# Patient Record
Sex: FEMALE | Race: WHITE | NOT HISPANIC OR LATINO
[De-identification: names, ages, dates, MRNs, and addresses within clinical notes are randomized per-mention and may not be internally consistent; named-entity substitution may affect disease eponyms.]

---

## 2017-06-03 ENCOUNTER — TRANSCRIPTION ENCOUNTER (OUTPATIENT)
Age: 37
End: 2017-06-03

## 2017-06-03 ENCOUNTER — INPATIENT (INPATIENT)
Facility: HOSPITAL | Age: 37
LOS: 2 days | Discharge: HOME | End: 2017-06-06
Attending: INTERNAL MEDICINE

## 2017-06-03 DIAGNOSIS — N10 ACUTE PYELONEPHRITIS: ICD-10-CM

## 2017-06-28 DIAGNOSIS — R94.5 ABNORMAL RESULTS OF LIVER FUNCTION STUDIES: ICD-10-CM

## 2017-06-28 DIAGNOSIS — Z98.51 TUBAL LIGATION STATUS: ICD-10-CM

## 2017-06-28 DIAGNOSIS — K59.00 CONSTIPATION, UNSPECIFIED: ICD-10-CM

## 2017-06-28 DIAGNOSIS — B96.20 UNSPECIFIED ESCHERICHIA COLI [E. COLI] AS THE CAUSE OF DISEASES CLASSIFIED ELSEWHERE: ICD-10-CM

## 2017-06-28 DIAGNOSIS — N10 ACUTE PYELONEPHRITIS: ICD-10-CM

## 2017-09-29 ENCOUNTER — EMERGENCY (EMERGENCY)
Facility: HOSPITAL | Age: 37
LOS: 0 days | Discharge: HOME | End: 2017-09-30
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
## 2017-09-29 DIAGNOSIS — Z90.49 ACQUIRED ABSENCE OF OTHER SPECIFIED PARTS OF DIGESTIVE TRACT: ICD-10-CM

## 2017-09-29 DIAGNOSIS — Z98.890 OTHER SPECIFIED POSTPROCEDURAL STATES: ICD-10-CM

## 2017-09-29 DIAGNOSIS — S61.210A LACERATION WITHOUT FOREIGN BODY OF RIGHT INDEX FINGER WITHOUT DAMAGE TO NAIL, INITIAL ENCOUNTER: ICD-10-CM
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
## 2017-09-29 DIAGNOSIS — N10 ACUTE PYELONEPHRITIS: ICD-10-CM

## 2017-09-29 DIAGNOSIS — Z98.51 TUBAL LIGATION STATUS: ICD-10-CM

## 2017-09-29 DIAGNOSIS — Z23 ENCOUNTER FOR IMMUNIZATION: ICD-10-CM

## 2017-09-29 DIAGNOSIS — S61.212A LACERATION WITHOUT FOREIGN BODY OF RIGHT MIDDLE FINGER WITHOUT DAMAGE TO NAIL, INITIAL ENCOUNTER: ICD-10-CM
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
## 2017-10-02 ENCOUNTER — EMERGENCY (EMERGENCY)
Facility: HOSPITAL | Age: 37
LOS: 0 days | Discharge: HOME | End: 2017-10-02
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
## 2017-10-02 DIAGNOSIS — Z98.51 TUBAL LIGATION STATUS: ICD-10-CM

## 2017-10-02 DIAGNOSIS — N10 ACUTE PYELONEPHRITIS: ICD-10-CM
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
## 2017-10-02 DIAGNOSIS — M79.89 OTHER SPECIFIED SOFT TISSUE DISORDERS: ICD-10-CM

## 2017-10-02 DIAGNOSIS — Z90.49 ACQUIRED ABSENCE OF OTHER SPECIFIED PARTS OF DIGESTIVE TRACT: ICD-10-CM
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
## 2017-10-02 DIAGNOSIS — T88.1XXA OTHER COMPLICATIONS FOLLOWING IMMUNIZATION, NOT ELSEWHERE CLASSIFIED, INITIAL ENCOUNTER: ICD-10-CM

## 2018-02-27 ENCOUNTER — OUTPATIENT (OUTPATIENT)
Dept: OUTPATIENT SERVICES | Facility: HOSPITAL | Age: 38
LOS: 1 days | Discharge: HOME | End: 2018-02-27

## 2018-02-27 DIAGNOSIS — R10.819 ABDOMINAL TENDERNESS, UNSPECIFIED SITE: ICD-10-CM

## 2018-08-13 ENCOUNTER — EMERGENCY (EMERGENCY)
Facility: HOSPITAL | Age: 38
LOS: 0 days | Discharge: HOME | End: 2018-08-14
Attending: EMERGENCY MEDICINE | Admitting: EMERGENCY MEDICINE

## 2018-08-13 VITALS
HEIGHT: 67 IN | TEMPERATURE: 97 F | SYSTOLIC BLOOD PRESSURE: 109 MMHG | RESPIRATION RATE: 16 BRPM | HEART RATE: 77 BPM | WEIGHT: 149.91 LBS | DIASTOLIC BLOOD PRESSURE: 76 MMHG | OXYGEN SATURATION: 96 %

## 2018-08-13 VITALS
DIASTOLIC BLOOD PRESSURE: 76 MMHG | HEART RATE: 68 BPM | TEMPERATURE: 98 F | SYSTOLIC BLOOD PRESSURE: 114 MMHG | OXYGEN SATURATION: 100 % | RESPIRATION RATE: 17 BRPM

## 2018-08-13 DIAGNOSIS — Z87.440 PERSONAL HISTORY OF URINARY (TRACT) INFECTIONS: ICD-10-CM

## 2018-08-13 DIAGNOSIS — Z79.2 LONG TERM (CURRENT) USE OF ANTIBIOTICS: ICD-10-CM

## 2018-08-13 DIAGNOSIS — Z90.49 ACQUIRED ABSENCE OF OTHER SPECIFIED PARTS OF DIGESTIVE TRACT: ICD-10-CM

## 2018-08-13 DIAGNOSIS — R39.9 UNSPECIFIED SYMPTOMS AND SIGNS INVOLVING THE GENITOURINARY SYSTEM: ICD-10-CM

## 2018-08-13 DIAGNOSIS — N39.0 URINARY TRACT INFECTION, SITE NOT SPECIFIED: ICD-10-CM

## 2018-08-13 DIAGNOSIS — Z79.899 OTHER LONG TERM (CURRENT) DRUG THERAPY: ICD-10-CM

## 2018-08-13 DIAGNOSIS — M54.5 LOW BACK PAIN: ICD-10-CM

## 2018-08-13 LAB
ALBUMIN SERPL ELPH-MCNC: 4.7 G/DL — SIGNIFICANT CHANGE UP (ref 3.5–5.2)
ALP SERPL-CCNC: 54 U/L — SIGNIFICANT CHANGE UP (ref 30–115)
ALT FLD-CCNC: 12 U/L — SIGNIFICANT CHANGE UP (ref 0–41)
ANION GAP SERPL CALC-SCNC: 17 MMOL/L — HIGH (ref 7–14)
APPEARANCE UR: ABNORMAL
AST SERPL-CCNC: 13 U/L — SIGNIFICANT CHANGE UP (ref 0–41)
BACTERIA # UR AUTO: ABNORMAL
BASOPHILS # BLD AUTO: 0.03 K/UL — SIGNIFICANT CHANGE UP (ref 0–0.2)
BASOPHILS NFR BLD AUTO: 0.4 % — SIGNIFICANT CHANGE UP (ref 0–1)
BILIRUB SERPL-MCNC: 0.5 MG/DL — SIGNIFICANT CHANGE UP (ref 0.2–1.2)
BILIRUB UR-MCNC: NEGATIVE — SIGNIFICANT CHANGE UP
BUN SERPL-MCNC: 13 MG/DL — SIGNIFICANT CHANGE UP (ref 10–20)
CA-I SERPL-SCNC: 1.17 MMOL/L — SIGNIFICANT CHANGE UP (ref 1.12–1.3)
CALCIUM SERPL-MCNC: 9.3 MG/DL — SIGNIFICANT CHANGE UP (ref 8.5–10.1)
CHLORIDE SERPL-SCNC: 100 MMOL/L — SIGNIFICANT CHANGE UP (ref 98–110)
CO2 SERPL-SCNC: 23 MMOL/L — SIGNIFICANT CHANGE UP (ref 17–32)
COD CRY URNS QL: NEGATIVE — SIGNIFICANT CHANGE UP
COLOR SPEC: ABNORMAL
CREAT SERPL-MCNC: 0.7 MG/DL — SIGNIFICANT CHANGE UP (ref 0.7–1.5)
DIFF PNL FLD: NEGATIVE — SIGNIFICANT CHANGE UP
EOSINOPHIL # BLD AUTO: 0.22 K/UL — SIGNIFICANT CHANGE UP (ref 0–0.7)
EOSINOPHIL NFR BLD AUTO: 2.9 % — SIGNIFICANT CHANGE UP (ref 0–8)
EPI CELLS # UR: ABNORMAL /HPF
GAS PNL BLDV: 138 MMOL/L — SIGNIFICANT CHANGE UP (ref 136–145)
GAS PNL BLDV: SIGNIFICANT CHANGE UP
GLUCOSE SERPL-MCNC: 93 MG/DL — SIGNIFICANT CHANGE UP (ref 70–99)
GLUCOSE UR QL: 100 MG/DL
GRAN CASTS # UR COMP ASSIST: NEGATIVE — SIGNIFICANT CHANGE UP
HCG SERPL QL: NEGATIVE — SIGNIFICANT CHANGE UP
HCO3 BLDV-SCNC: 27 MMOL/L — SIGNIFICANT CHANGE UP (ref 22–29)
HCT VFR BLD CALC: 44.1 % — SIGNIFICANT CHANGE UP (ref 37–47)
HCT VFR BLDA CALC: 48.2 % — HIGH (ref 34–44)
HGB BLD CALC-MCNC: 15.7 G/DL — SIGNIFICANT CHANGE UP (ref 14–18)
HGB BLD-MCNC: 14.7 G/DL — SIGNIFICANT CHANGE UP (ref 12–16)
HOROWITZ INDEX BLDV+IHG-RTO: 21 — SIGNIFICANT CHANGE UP
HYALINE CASTS # UR AUTO: NEGATIVE — SIGNIFICANT CHANGE UP
IMM GRANULOCYTES NFR BLD AUTO: 0.3 % — SIGNIFICANT CHANGE UP (ref 0.1–0.3)
KETONES UR-MCNC: ABNORMAL
LACTATE BLDV-MCNC: 0.9 MMOL/L — SIGNIFICANT CHANGE UP (ref 0.5–1.6)
LACTATE SERPL-SCNC: 3.3 MMOL/L — HIGH (ref 0.5–2.2)
LEUKOCYTE ESTERASE UR-ACNC: NEGATIVE — SIGNIFICANT CHANGE UP
LYMPHOCYTES # BLD AUTO: 1.56 K/UL — SIGNIFICANT CHANGE UP (ref 1.2–3.4)
LYMPHOCYTES # BLD AUTO: 20.6 % — SIGNIFICANT CHANGE UP (ref 20.5–51.1)
MCHC RBC-ENTMCNC: 29.3 PG — SIGNIFICANT CHANGE UP (ref 27–31)
MCHC RBC-ENTMCNC: 33.3 G/DL — SIGNIFICANT CHANGE UP (ref 32–37)
MCV RBC AUTO: 88 FL — SIGNIFICANT CHANGE UP (ref 81–99)
MONOCYTES # BLD AUTO: 0.45 K/UL — SIGNIFICANT CHANGE UP (ref 0.1–0.6)
MONOCYTES NFR BLD AUTO: 5.9 % — SIGNIFICANT CHANGE UP (ref 1.7–9.3)
NEUTROPHILS # BLD AUTO: 5.29 K/UL — SIGNIFICANT CHANGE UP (ref 1.4–6.5)
NEUTROPHILS NFR BLD AUTO: 69.9 % — SIGNIFICANT CHANGE UP (ref 42.2–75.2)
NITRITE UR-MCNC: POSITIVE
NRBC # BLD: 0 /100 WBCS — SIGNIFICANT CHANGE UP (ref 0–0)
PCO2 BLDV: 49 MMHG — SIGNIFICANT CHANGE UP (ref 41–51)
PH BLDV: 7.34 — SIGNIFICANT CHANGE UP (ref 7.26–7.43)
PH UR: 5.5 — SIGNIFICANT CHANGE UP (ref 5–8)
PLATELET # BLD AUTO: 271 K/UL — SIGNIFICANT CHANGE UP (ref 130–400)
PO2 BLDV: 26 MMHG — SIGNIFICANT CHANGE UP (ref 20–40)
POTASSIUM BLDV-SCNC: 3.9 MMOL/L — SIGNIFICANT CHANGE UP (ref 3.3–5.6)
POTASSIUM SERPL-MCNC: 3.9 MMOL/L — SIGNIFICANT CHANGE UP (ref 3.5–5)
POTASSIUM SERPL-SCNC: 3.9 MMOL/L — SIGNIFICANT CHANGE UP (ref 3.5–5)
PROT SERPL-MCNC: 7.4 G/DL — SIGNIFICANT CHANGE UP (ref 6–8)
PROT UR-MCNC: 30 MG/DL
RBC # BLD: 5.01 M/UL — SIGNIFICANT CHANGE UP (ref 4.2–5.4)
RBC # FLD: 12.2 % — SIGNIFICANT CHANGE UP (ref 11.5–14.5)
RBC CASTS # UR COMP ASSIST: NEGATIVE — SIGNIFICANT CHANGE UP
SAO2 % BLDV: 42 % — SIGNIFICANT CHANGE UP
SODIUM SERPL-SCNC: 140 MMOL/L — SIGNIFICANT CHANGE UP (ref 135–146)
SP GR SPEC: 1.01 — SIGNIFICANT CHANGE UP (ref 1.01–1.03)
TRI-PHOS CRY UR QL COMP ASSIST: NEGATIVE — SIGNIFICANT CHANGE UP
URATE CRY FLD QL MICRO: NEGATIVE — SIGNIFICANT CHANGE UP
UROBILINOGEN FLD QL: 2 MG/DL (ref 0.2–0.2)
WBC # BLD: 7.57 K/UL — SIGNIFICANT CHANGE UP (ref 4.8–10.8)
WBC # FLD AUTO: 7.57 K/UL — SIGNIFICANT CHANGE UP (ref 4.8–10.8)
WBC UR QL: NEGATIVE — SIGNIFICANT CHANGE UP

## 2018-08-13 RX ORDER — MORPHINE SULFATE 50 MG/1
2 CAPSULE, EXTENDED RELEASE ORAL ONCE
Qty: 0 | Refills: 0 | Status: DISCONTINUED | OUTPATIENT
Start: 2018-08-13 | End: 2018-08-13

## 2018-08-13 RX ORDER — KETOROLAC TROMETHAMINE 30 MG/ML
30 SYRINGE (ML) INJECTION ONCE
Qty: 0 | Refills: 0 | Status: DISCONTINUED | OUTPATIENT
Start: 2018-08-13 | End: 2018-08-13

## 2018-08-13 RX ORDER — ONDANSETRON 8 MG/1
4 TABLET, FILM COATED ORAL ONCE
Qty: 0 | Refills: 0 | Status: COMPLETED | OUTPATIENT
Start: 2018-08-13 | End: 2018-08-13

## 2018-08-13 RX ORDER — SODIUM CHLORIDE 9 MG/ML
1000 INJECTION INTRAMUSCULAR; INTRAVENOUS; SUBCUTANEOUS ONCE
Qty: 0 | Refills: 0 | Status: DISCONTINUED | OUTPATIENT
Start: 2018-08-13 | End: 2018-08-14

## 2018-08-13 RX ORDER — SODIUM CHLORIDE 9 MG/ML
1000 INJECTION INTRAMUSCULAR; INTRAVENOUS; SUBCUTANEOUS ONCE
Qty: 0 | Refills: 0 | Status: COMPLETED | OUTPATIENT
Start: 2018-08-13 | End: 2018-08-13

## 2018-08-13 RX ADMIN — ONDANSETRON 4 MILLIGRAM(S): 8 TABLET, FILM COATED ORAL at 20:00

## 2018-08-13 RX ADMIN — MORPHINE SULFATE 2 MILLIGRAM(S): 50 CAPSULE, EXTENDED RELEASE ORAL at 20:00

## 2018-08-13 RX ADMIN — MORPHINE SULFATE 2 MILLIGRAM(S): 50 CAPSULE, EXTENDED RELEASE ORAL at 21:04

## 2018-08-13 RX ADMIN — MORPHINE SULFATE 2 MILLIGRAM(S): 50 CAPSULE, EXTENDED RELEASE ORAL at 21:03

## 2018-08-13 RX ADMIN — MORPHINE SULFATE 2 MILLIGRAM(S): 50 CAPSULE, EXTENDED RELEASE ORAL at 23:23

## 2018-08-13 RX ADMIN — Medication 30 MILLIGRAM(S): at 23:22

## 2018-08-13 RX ADMIN — SODIUM CHLORIDE 1000 MILLILITER(S): 9 INJECTION INTRAMUSCULAR; INTRAVENOUS; SUBCUTANEOUS at 20:00

## 2018-08-13 NOTE — ED PROVIDER NOTE - OBJECTIVE STATEMENT
this is 38 yo female presents to ed for evaluation of right side flank pain. patient states she was having dsyuria since sat . patient went to urgent care and was started on antibiotics. patient states she started to have pain in her flank.  patient denies any fever and or chills. patient denies any nausea or vomiting. patient has history of uti in past.

## 2018-08-13 NOTE — ED PROVIDER NOTE - NS ED ROS FT
Review of Systems:  	•	CONSTITUTIONAL - no fever, no diaphoresis, no chills  	•	SKIN - no rash  	•	HEMATOLOGIC - no bleeding, no bruising  	•	  	•	RESPIRATORY - no shortness of breath, no cough  	•	CARDIAC - no chest pain, no palpitations  	•	GI - no abd pain, no nausea, no vomiting, no diarrhea, no constipation  	•	GENITO-URINARY - no discharge, no dysuria; no hematuria, no increased urinary frequency  	•	MUSCULOSKELETAL - no joint paint, no swelling, no redness  	•	NEUROLOGIC - no weakness, no headache, no paresthesias, no LOC  	•	PSYCH - no anxiety, non suicidal, non homicidal, no hallucination, no depression

## 2018-08-13 NOTE — ED ADULT NURSE NOTE - NSIMPLEMENTINTERV_GEN_ALL_ED
Implemented All Universal Safety Interventions:  [place] to call system. Call bell, personal items and telephone within reach. Instruct patient to call for assistance. Room bathroom lighting operational. Non-slip footwear when patient is off stretcher. Physically safe environment: no spills, clutter or unnecessary equipment. Stretcher in lowest position, wheels locked, appropriate side rails in place.

## 2018-08-13 NOTE — ED PROVIDER NOTE - PROGRESS NOTE DETAILS
ATTENDING NOTE:  I personally evaluated the patient. I reviewed the Resident’s or Physician Assistant’s note (as assigned above), and agree with the findings and plan except as documented in my note.  37yF no medical problems, surgical hx of appendectomy and endometrial ablation coming in for right sided flank pain and low back pain.  Pt explains she developed UTI 1 week ago, was having dysuria and urinary frequency, no hematuria.  Pt then 5 days ago developed right lower back pain and worsening dysuria.  Seen by C on Saturday, started on cephalexin.  Today pt still with sx and developed fever, subjective.  Seen PCP and sent for eval in setting of pt having a hx of pyelonephritis in past.  No vaginal bleeding or discharge.  No rash.  No abd pain.  No vomiting.  No hx of kidney stones in past.  ON EXAM:  Pt is well appearing, non toxic.  CVS RRR.  Lungs CTA b/l.  abd soft nt/nd.  (+)Right CVA tenderness.  No skin rash noted.  PLAN:  Labs, urine, CT scan.

## 2018-08-13 NOTE — ED PROVIDER NOTE - PHYSICAL EXAMINATION
--EXAM--  VITAL SIGNS: I have reviewed vs documented at present.  CONSTITUTIONAL: Well-developed; well-nourished; in no acute distress.   SKIN: Warm and dry, no acute rash.   HEAD: Normocephalic; atraumatic.    CARD: S1, S2, Regular rate and rhythm.   RESP: No wheezes, rales or rhonchi.  ABD: Normal bowel sounds; soft; non-distended; non-tender. right CVA Tendernss  EXT: Normal ROM.   NEURO: Alert, oriented, grossly unremarkable. Strength 5/5 in all extremities. Sensation intact throughout.  PSYCH: Cooperative, appropriate.

## 2018-08-13 NOTE — ED PROVIDER NOTE - MEDICAL DECISION MAKING DETAILS
pt with back pain  and  uti  on keflex  x 3 days -  no fever -  ct no kidney stone no pyelo - will change abx -  ivf  given lactate decreased - Patient to be discharged from ED. Any available test results were discussed with patient and/or family. Verbal instructions given, including instructions to return to ED immediately for any new, worsening, or concerning symptoms. Patient endorsed understanding. Written discharge instructions additionally given, including follow-up plan.

## 2018-08-14 RX ORDER — CEFPODOXIME PROXETIL 100 MG
1 TABLET ORAL
Qty: 14 | Refills: 0
Start: 2018-08-14 | End: 2018-08-20

## 2018-09-17 ENCOUNTER — TRANSCRIPTION ENCOUNTER (OUTPATIENT)
Age: 38
End: 2018-09-17

## 2019-01-12 ENCOUNTER — TRANSCRIPTION ENCOUNTER (OUTPATIENT)
Age: 39
End: 2019-01-12

## 2019-12-22 ENCOUNTER — EMERGENCY (EMERGENCY)
Facility: HOSPITAL | Age: 39
LOS: 0 days | Discharge: HOME | End: 2019-12-22
Admitting: EMERGENCY MEDICINE
Payer: OTHER MISCELLANEOUS

## 2019-12-22 VITALS
OXYGEN SATURATION: 100 % | TEMPERATURE: 99 F | RESPIRATION RATE: 18 BRPM | SYSTOLIC BLOOD PRESSURE: 117 MMHG | HEART RATE: 80 BPM | DIASTOLIC BLOOD PRESSURE: 69 MMHG
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
## 2019-12-22 DIAGNOSIS — M25.512 PAIN IN LEFT SHOULDER: ICD-10-CM
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
## 2019-12-22 DIAGNOSIS — M25.519 PAIN IN UNSPECIFIED SHOULDER: ICD-10-CM

## 2019-12-22 PROCEDURE — 73030 X-RAY EXAM OF SHOULDER: CPT | Mod: 26,LT

## 2019-12-22 PROCEDURE — 99283 EMERGENCY DEPT VISIT LOW MDM: CPT

## 2019-12-22 NOTE — ED ADULT NURSE NOTE - OBJECTIVE STATEMENT
Pt state the she was carrying her equipment bag on her left shoulder when she stumbled forward and dropped the bag. As she dropped the bags she felt a  pop in her shoulder . Pt is able rotate arm but unable to lift arm up. Pt states pain is an 8/10. pulse palpable in extremity. No swelling or bruising noted. Will continue to monitor.

## 2019-12-22 NOTE — ED PROVIDER NOTE - CARE PROVIDER_API CALL
soft
Mitchel Arroyo (MD)  Orthopaedic Surgery  [address] [place] [address]  Phone: [phone]  Fax: [phone]  Follow Up Time:

## 2019-12-22 NOTE — ED PROVIDER NOTE - PATIENT PORTAL LINK FT
You can access the FollowMyHealth Patient Portal offered by [facility] by registering at the following website: [email or web address]. By joining [employer]’s FollowMyHealth portal, you will also be able to view your health information using other applications (apps) compatible with our system.

## 2019-12-22 NOTE — ED PROCEDURE NOTE - CPROC ED POST PROC CARE GUIDE1
Elevate the injured extremity as instructed./Instructed patient/caregiver to follow-up with primary care physician./Verbal/written post procedure instructions were given to patient/caregiver./Instructed patient/caregiver regarding signs and symptoms of infection.

## 2019-12-22 NOTE — ED PROVIDER NOTE - OBJECTIVE STATEMENT
Patient c/o left shoulder today at work. 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 The load shifted and felt pop over left shoulder with pain. No numbness, Pain worse with movement.

## 2019-12-22 NOTE — ED ADULT TRIAGE NOTE - CHIEF COMPLAINT QUOTE
pt was carrying heavy bag down steps, felt the weight of the bag pop her shoulder. pt complaining of left shoulder pain radiating to elbow.

## 2019-12-22 NOTE — ED ADULT NURSE NOTE - NSIMPLEMENTINTERV_GEN_ALL_ED
Implemented All Universal Safety Interventions:  [place] to call system. Call bell, personal items and telephone within reach. Instruct patient to call for assistance. Room bathroom lighting operational. Non-slip footwear when patient is off stretcher. Physically safe environment: no spills, clutter or unnecessary equipment. Stretcher in lowest position, wheels locked, appropriate side rails in place.

## 2019-12-22 NOTE — ED PROVIDER NOTE - CARE PROVIDERS DIRECT ADDRESSES
[email or web address]

## 2019-12-22 NOTE — ED ADULT NURSE NOTE - NS ED NURSE DC INFO COMPLEXITY
Verbalized Understanding/Simple: Patient demonstrates quick and easy understanding/Patient asked questions/Returned Demonstration/Straightforward: Basic instructions, no meds, no home treatment

## 2020-04-15 ENCOUNTER — TRANSCRIPTION ENCOUNTER (OUTPATIENT)
Age: 40
End: 2020-04-15

## 2020-04-26 ENCOUNTER — MESSAGE (OUTPATIENT)
Age: 40
End: 2020-04-26

## 2020-09-28 ENCOUNTER — EMERGENCY (EMERGENCY)
Facility: HOSPITAL | Age: 40
LOS: 0 days | Discharge: HOME | End: 2020-09-28
Attending: EMERGENCY MEDICINE | Admitting: EMERGENCY MEDICINE
Payer: COMMERCIAL

## 2020-09-28 VITALS
TEMPERATURE: 97 F | DIASTOLIC BLOOD PRESSURE: 62 MMHG | RESPIRATION RATE: 18 BRPM | SYSTOLIC BLOOD PRESSURE: 106 MMHG | OXYGEN SATURATION: 98 % | HEART RATE: 66 BPM

## 2020-09-28 VITALS
HEIGHT: 67 IN | TEMPERATURE: 98 F | DIASTOLIC BLOOD PRESSURE: 69 MMHG | HEART RATE: 104 BPM | WEIGHT: 162.04 LBS | RESPIRATION RATE: 18 BRPM | OXYGEN SATURATION: 98 % | SYSTOLIC BLOOD PRESSURE: 108 MMHG

## 2020-09-28 DIAGNOSIS — R10.9 UNSPECIFIED ABDOMINAL PAIN: ICD-10-CM

## 2020-09-28 DIAGNOSIS — N83.202 UNSPECIFIED OVARIAN CYST, LEFT SIDE: ICD-10-CM

## 2020-09-28 DIAGNOSIS — R30.0 DYSURIA: ICD-10-CM

## 2020-09-28 DIAGNOSIS — Z87.891 PERSONAL HISTORY OF NICOTINE DEPENDENCE: ICD-10-CM

## 2020-09-28 LAB
ALBUMIN SERPL ELPH-MCNC: 4.2 G/DL — SIGNIFICANT CHANGE UP (ref 3.5–5.2)
ALP SERPL-CCNC: 42 U/L — SIGNIFICANT CHANGE UP (ref 30–115)
ALT FLD-CCNC: 11 U/L — SIGNIFICANT CHANGE UP (ref 0–41)
ANION GAP SERPL CALC-SCNC: 10 MMOL/L — SIGNIFICANT CHANGE UP (ref 7–14)
ANION GAP SERPL CALC-SCNC: 9 MMOL/L — SIGNIFICANT CHANGE UP (ref 7–14)
APPEARANCE UR: CLEAR — SIGNIFICANT CHANGE UP
AST SERPL-CCNC: 39 U/L — SIGNIFICANT CHANGE UP (ref 0–41)
BASOPHILS # BLD AUTO: 0.03 K/UL — SIGNIFICANT CHANGE UP (ref 0–0.2)
BASOPHILS NFR BLD AUTO: 0.5 % — SIGNIFICANT CHANGE UP (ref 0–1)
BILIRUB SERPL-MCNC: 0.5 MG/DL — SIGNIFICANT CHANGE UP (ref 0.2–1.2)
BILIRUB UR-MCNC: NEGATIVE — SIGNIFICANT CHANGE UP
BUN SERPL-MCNC: 21 MG/DL — HIGH (ref 10–20)
BUN SERPL-MCNC: 21 MG/DL — HIGH (ref 10–20)
CALCIUM SERPL-MCNC: 8.4 MG/DL — LOW (ref 8.5–10.1)
CALCIUM SERPL-MCNC: 9 MG/DL — SIGNIFICANT CHANGE UP (ref 8.5–10.1)
CHLORIDE SERPL-SCNC: 102 MMOL/L — SIGNIFICANT CHANGE UP (ref 98–110)
CHLORIDE SERPL-SCNC: 103 MMOL/L — SIGNIFICANT CHANGE UP (ref 98–110)
CO2 SERPL-SCNC: 22 MMOL/L — SIGNIFICANT CHANGE UP (ref 17–32)
CO2 SERPL-SCNC: 22 MMOL/L — SIGNIFICANT CHANGE UP (ref 17–32)
COLOR SPEC: YELLOW — SIGNIFICANT CHANGE UP
CREAT SERPL-MCNC: 0.8 MG/DL — SIGNIFICANT CHANGE UP (ref 0.7–1.5)
CREAT SERPL-MCNC: 0.8 MG/DL — SIGNIFICANT CHANGE UP (ref 0.7–1.5)
DIFF PNL FLD: NEGATIVE — SIGNIFICANT CHANGE UP
EOSINOPHIL # BLD AUTO: 0.24 K/UL — SIGNIFICANT CHANGE UP (ref 0–0.7)
EOSINOPHIL NFR BLD AUTO: 4.3 % — SIGNIFICANT CHANGE UP (ref 0–8)
GLUCOSE SERPL-MCNC: 85 MG/DL — SIGNIFICANT CHANGE UP (ref 70–99)
GLUCOSE SERPL-MCNC: 91 MG/DL — SIGNIFICANT CHANGE UP (ref 70–99)
GLUCOSE UR QL: NEGATIVE — SIGNIFICANT CHANGE UP
HCT VFR BLD CALC: 41.5 % — SIGNIFICANT CHANGE UP (ref 37–47)
HGB BLD-MCNC: 14.2 G/DL — SIGNIFICANT CHANGE UP (ref 12–16)
IMM GRANULOCYTES NFR BLD AUTO: 0.4 % — HIGH (ref 0.1–0.3)
KETONES UR-MCNC: NEGATIVE — SIGNIFICANT CHANGE UP
LEUKOCYTE ESTERASE UR-ACNC: NEGATIVE — SIGNIFICANT CHANGE UP
LYMPHOCYTES # BLD AUTO: 1.7 K/UL — SIGNIFICANT CHANGE UP (ref 1.2–3.4)
LYMPHOCYTES # BLD AUTO: 30.7 % — SIGNIFICANT CHANGE UP (ref 20.5–51.1)
MCHC RBC-ENTMCNC: 29.3 PG — SIGNIFICANT CHANGE UP (ref 27–31)
MCHC RBC-ENTMCNC: 34.2 G/DL — SIGNIFICANT CHANGE UP (ref 32–37)
MCV RBC AUTO: 85.7 FL — SIGNIFICANT CHANGE UP (ref 81–99)
MONOCYTES # BLD AUTO: 0.48 K/UL — SIGNIFICANT CHANGE UP (ref 0.1–0.6)
MONOCYTES NFR BLD AUTO: 8.7 % — SIGNIFICANT CHANGE UP (ref 1.7–9.3)
NEUTROPHILS # BLD AUTO: 3.07 K/UL — SIGNIFICANT CHANGE UP (ref 1.4–6.5)
NEUTROPHILS NFR BLD AUTO: 55.4 % — SIGNIFICANT CHANGE UP (ref 42.2–75.2)
NITRITE UR-MCNC: NEGATIVE — SIGNIFICANT CHANGE UP
NRBC # BLD: 0 /100 WBCS — SIGNIFICANT CHANGE UP (ref 0–0)
PH UR: 7 — SIGNIFICANT CHANGE UP (ref 5–8)
PLATELET # BLD AUTO: 261 K/UL — SIGNIFICANT CHANGE UP (ref 130–400)
POTASSIUM SERPL-MCNC: 4 MMOL/L — SIGNIFICANT CHANGE UP (ref 3.5–5)
POTASSIUM SERPL-MCNC: 6.1 MMOL/L — CRITICAL HIGH (ref 3.5–5)
POTASSIUM SERPL-SCNC: 4 MMOL/L — SIGNIFICANT CHANGE UP (ref 3.5–5)
POTASSIUM SERPL-SCNC: 6.1 MMOL/L — CRITICAL HIGH (ref 3.5–5)
PROT SERPL-MCNC: 6.9 G/DL — SIGNIFICANT CHANGE UP (ref 6–8)
PROT UR-MCNC: SIGNIFICANT CHANGE UP
RBC # BLD: 4.84 M/UL — SIGNIFICANT CHANGE UP (ref 4.2–5.4)
RBC # FLD: 12.2 % — SIGNIFICANT CHANGE UP (ref 11.5–14.5)
SODIUM SERPL-SCNC: 134 MMOL/L — LOW (ref 135–146)
SODIUM SERPL-SCNC: 134 MMOL/L — LOW (ref 135–146)
SP GR SPEC: 1.03 — HIGH (ref 1.01–1.03)
UROBILINOGEN FLD QL: SIGNIFICANT CHANGE UP
WBC # BLD: 5.54 K/UL — SIGNIFICANT CHANGE UP (ref 4.8–10.8)
WBC # FLD AUTO: 5.54 K/UL — SIGNIFICANT CHANGE UP (ref 4.8–10.8)

## 2020-09-28 PROCEDURE — 99285 EMERGENCY DEPT VISIT HI MDM: CPT

## 2020-09-28 PROCEDURE — 74177 CT ABD & PELVIS W/CONTRAST: CPT | Mod: 26

## 2020-09-28 RX ORDER — SODIUM CHLORIDE 9 MG/ML
3 INJECTION INTRAMUSCULAR; INTRAVENOUS; SUBCUTANEOUS EVERY 8 HOURS
Refills: 0 | Status: DISCONTINUED | OUTPATIENT
Start: 2020-09-28 | End: 2020-09-28

## 2020-09-28 RX ORDER — KETOROLAC TROMETHAMINE 30 MG/ML
30 SYRINGE (ML) INJECTION ONCE
Refills: 0 | Status: DISCONTINUED | OUTPATIENT
Start: 2020-09-28 | End: 2020-09-28

## 2020-09-28 RX ORDER — SODIUM CHLORIDE 9 MG/ML
1000 INJECTION INTRAMUSCULAR; INTRAVENOUS; SUBCUTANEOUS ONCE
Refills: 0 | Status: COMPLETED | OUTPATIENT
Start: 2020-09-28 | End: 2020-09-28

## 2020-09-28 RX ADMIN — Medication 30 MILLIGRAM(S): at 21:13

## 2020-09-28 RX ADMIN — SODIUM CHLORIDE 1000 MILLILITER(S): 9 INJECTION INTRAMUSCULAR; INTRAVENOUS; SUBCUTANEOUS at 19:51

## 2020-09-28 RX ADMIN — Medication 30 MILLIGRAM(S): at 19:46

## 2020-09-28 NOTE — ED PROVIDER NOTE - OBJECTIVE STATEMENT
39 yo female with pmh/surg hx of appendectomy, endometrial ablation, hyterectomy 2019, covid + in April now with residual lung disease, ovarian cysts and ovarian cyst ruptures presents to the ER for 1 1/2 weeks of left flank pain that is now moving to LLQ. 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 +nausea comes in waves, no vomiting/diarrhea/fever/cough/chills/sob/cp/headache/neck pain/focal neuro deficits/rash/hx of trauma.     ALL: nkda  PMH as above  Meds: Breo (stopped taking), tumeric  SH: former [employer]  PMD Jossie Randall  LMP denies s/p hysterectomy 41 yo female with pmh/surg hx of appendectomy, endometrial ablation, hysterectomy 2019, covid + in April now with residual lung disease, ovarian cysts and ovarian cyst ruptures presents to the ER for 1 1/2 weeks of left flank pain that is now moving to LLQ. 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 +nausea comes in waves, no vomiting/diarrhea/fever/cough/chills/sob/cp/headache/neck pain/focal neuro deficits/rash/hx of trauma.     ALL: nkda  PMH as above  Meds: Breo (stopped taking), tumeric  SH: former [employer]  PMD Jossie Randall  LMP denies s/p hysterectomy

## 2020-09-28 NOTE — ED PROVIDER NOTE - CLINICAL SUMMARY MEDICAL DECISION MAKING FREE TEXT BOX
41 yo female with pmh/surg hx of appendectomy, endometrial ablation, hysterectomy 2019, covid + in April now with residual lung disease, ovarian cysts and ovarian cyst ruptures presents to the ER for 1 1/2 weeks of left flank pain that is now moving to LLQ. 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 +nausea comes in waves, no vomiting/diarrhea/fever/cough/chills/sob/cp/headache/neck pain/focal neuro deficits/rash/hx of trauma. Pt had labs, urine and ct scan done--all ok, showed ruptured ovarian cyst. Will dc and recommend follow up with PMD.

## 2020-09-28 NOTE — ED PROVIDER NOTE - CARE PROVIDER_API CALL
Jossie Randall  [id number]  [address] [place] [address]  Phone: [phone]  Fax: [phone]  Established Patient  Follow Up Time:

## 2020-09-28 NOTE — ED ADULT NURSE NOTE - CHPI ED NUR SYMPTOMS NEG
no weakness/no dizziness/no tingling/no decreased eating/drinking/no fever/no nausea/no vomiting/no chills

## 2020-09-28 NOTE — ED PROVIDER NOTE - NSFOLLOWUPINSTRUCTIONS_ED_ALL_ED_FT
Ruptured Ovarian Cyst    WHAT YOU NEED TO KNOW:    What is a ruptured ovarian cyst? A ruptured ovarian cyst is a cyst that breaks open. A cyst is a sac that grows on an ovary. This sac usually contains fluid, but may sometimes have blood or tissue in it. A large cyst that ruptures may lead to problems that need immediate care.    What causes or increases my risk for a ruptured ovarian cyst? You may be at higher risk for a ruptured ovarian cyst if you have polycystic ovarian syndrome (PCOS). PCOS causes many cysts to grow on your ovary. Any of the following can lead to a ruptured cyst:     Hormone changes around the time of your monthly period       Pressure on the cyst from sports, sex, or an injury to the area (usually large cysts)       Pregnancy    What are the signs and symptoms of a ruptured ovarian cyst? You may have no signs or symptoms, or you may have any of the following:     Pain that can range from mild to severe or be mild at first but become severe quickly       Sudden, sharp, or stabbing pain that happens on one side       Pain that starts during activity or sex, or that gets worse when you move       Tenderness in the area of your ovary       A low fever       Nausea, vomiting, or dizziness    How is a ruptured ovarian cyst diagnosed? Your healthcare provider will examine you and ask about your symptoms. If you have pain, tell your provider what you were doing when you first felt the pain. Include anything that helps or increases the pain. Tell your provider if you or anyone in your family has a history of breast or ovarian cancer, or PCOS. You may need any of the following:     Blood tests are used to check for pregnancy or an ectopic pregnancy. This is when a fertilized egg is growing in a fallopian tube instead of the womb. You may also need to have hormone levels checked. Blood tests may also be used to check for signs of an infection or tumor.       Ultrasound pictures may show a cyst on your ovary. For this test, an ultrasound wand is inserted into your vagina and guided up toward your uterus. This helps your healthcare provider get a closer look at your ovaries.     How is a ruptured ovarian cyst treated? Treatment depends on your age, the size of the cyst, and if it caused problems that need treatment. Treatment may not be needed if the cyst was small or your body absorbed the fluid that came out of the cyst when it ruptured. You may need any of the following:     NSAIDs, such as ibuprofen, help decrease swelling, pain, and fever. This medicine is available with or without a doctor's order. NSAIDs can cause stomach bleeding or kidney problems in certain people. If you take blood thinner medicine, always ask if NSAIDs are safe for you. Always read the medicine label and follow directions. Do not give these medicines to children under 6 months of age without direction from your child's healthcare provider.      Prescription pain medicine may be given. Ask your healthcare provider how to take this medicine safely. Some prescription pain medicines contain acetaminophen. Do not take other medicines that contain acetaminophen without talking to your healthcare provider. Too much acetaminophen may cause liver damage. Prescription pain medicine may cause constipation. Ask your healthcare provider how to prevent or treat constipation.       Antibiotics may be needed to prevent or fight an infection caused by bacteria.       Surgery may be needed to remove fluid or blood in the area of the ruptured cyst. The outside of the ruptured cyst may also need to be removed.     What can I do to manage or prevent a ruptured ovarian cyst?     Apply heat where you have pain, as directed. Heat can help relieve mild pain. Use a heating pad (set on low) or hot water bottle. Wrap the pad or bottle in a towel before you apply it to your skin. Apply heat for 20 minutes every hour, or as directed. A warm bath may also help relieve the pain.      Ask when to come in for a follow-up examination. You may need another ultrasound 6 weeks after your cyst was treated. This will help make sure the cyst is no longer growing or causing health problems. You may also need ultrasound tests for 2 or 3 monthly periods to see how hormones affect your ovaries.      Ask about birth control pills. These may help reduce your risk for cysts. Ask your healthcare provider if birth control pills are right for you. The risk for a blood clot is higher if you take birth control pills, especially if you are older than 35 or smoke.       Have a pelvic exam every year. This may also be called a well woman visit. The exam will include a Pap smear to check for certain cancers. Your healthcare provider will also press on your abdomen to check for lumps or other problems. A pelvic exam can help find problems early. This makes treatment easier and more effective. Tell your healthcare provider if you notice any changes in your monthly periods. Examples include periods that start on a different day than usual, or are lighter or heavier than usual. Tell your provider if you have worse pain than usual, or if the pain is different than you had before.    Call 911 for any of the following:     You are too weak or dizzy to stand up.        When should I seek immediate care?     You have severe pain in your pelvis or in your abdomen.       You have pain along with a fever, nausea, or vomiting.       You have signs of shock from blood loss, such as dizziness, cold or clammy skin, or fast breathing.    When should I contact my healthcare provider?     You notice changes in your monthly periods, or you begin to have nausea or vomiting with your periods.      You have new or worsening symptoms.      Your pain does not get better with pain medicine.      You have pain during sex.      You have bleeding from your vagina that is not your period.      Your abdomen is swollen, or you have a full or heavy feeling in your lower abdomen.      You have trouble urinating.      You have questions or concerns about your condition or care.    CARE AGREEMENT:    You have the right to help plan your care. Learn about your health condition and how it may be treated. Discuss treatment options with your healthcare providers to decide what care you want to receive. You always have the right to refuse treatment.                       Abdominal Pain    Many things can cause abdominal pain. Usually, abdominal pain is not caused by a disease and will improve without treatment. Your health care provider will do a physical exam and possibly order blood tests and imaging to help determine the seriousness of your pain. However, in many cases, no cause may be found and you may need further testing as an outpatient. Monitor your abdominal pain for any changes.     SEEK IMMEDIATE MEDICAL CARE IF YOU HAVE THE FOLLOWING SYMPTOMS: worsening abdominal pain, vomiting, diarrhea, inability to have bowel movements or pass gas, black or bloody stool, fever accompanying chest pain or back pain, or dizziness/lightheadedness.

## 2020-09-28 NOTE — ED PROVIDER NOTE - CARE PLAN
Principal Discharge DX:	Abdominal pain  Secondary Diagnosis:	Flank pain  Secondary Diagnosis:	Ruptured cyst of left ovary

## 2020-09-28 NOTE — ED ADULT NURSE NOTE - CAS EDN DISCHARGE INTERVENTIONS
She does have right lower extremity pains  Question this is arthritis  Check x-ray of the knee  Follow up after that  IV discontinued, cath removed intact

## 2020-09-28 NOTE — ED PROVIDER NOTE - PATIENT PORTAL LINK FT
You can access the FollowMyHealth Patient Portal offered by [facility] by registering at the following website: [email or web address]. By joining [employer]’s FollowMyHealth portal, you will also be able to view your health information using other applications (apps) compatible with our system.

## 2020-09-28 NOTE — ED PROVIDER NOTE - PHYSICAL EXAMINATION
VITAL SIGNS: I have reviewed nursing notes and confirm.  CONSTITUTIONAL: Well-developed; well-nourished; in no acute distress.  SKIN: Skin exam is warm and dry, no acute rash.  HEAD: Normocephalic; atraumatic.  EYES: PERRL, EOM intact; conjunctiva and sclera clear.  ENT: No nasal discharge; airway clear.   NECK: Supple; non tender.  CARD: S1, S2 normal; no murmurs, gallops, or rubs. Regular rate and rhythm.  RESP: No wheezes, rales or rhonchi.  BACK: +LEFT flank tenderness  ABD: Normal bowel sounds; soft; non-distended; +LLQ/suprapubic tenderness, mild RLQ no rebound; no hepatosplenomegaly.  EXT: Normal ROM. No clubbing, cyanosis or edema.  LYMPH: No acute cervical adenopathy.  NEURO: Alert, oriented. Grossly unremarkable. No focal deficits.  PSYCH: Cooperative, appropriate.

## 2020-09-29 LAB
CULTURE RESULTS: SIGNIFICANT CHANGE UP
SPECIMEN SOURCE: SIGNIFICANT CHANGE UP

## 2020-10-20 ENCOUNTER — TRANSCRIPTION ENCOUNTER (OUTPATIENT)
Age: 40
End: 2020-10-20

## 2020-10-28 PROBLEM — Z00.00 ENCOUNTER FOR PREVENTIVE HEALTH EXAMINATION: Status: ACTIVE | Noted: 2020-10-28

## 2020-10-30 ENCOUNTER — OUTPATIENT (OUTPATIENT)
Dept: OUTPATIENT SERVICES | Facility: HOSPITAL | Age: 40
LOS: 1 days | Discharge: HOME | End: 2020-10-30
Payer: COMMERCIAL

## 2020-10-30 DIAGNOSIS — R92.8 OTHER ABNORMAL AND INCONCLUSIVE FINDINGS ON DIAGNOSTIC IMAGING OF BREAST: ICD-10-CM

## 2020-10-30 PROCEDURE — G0279: CPT | Mod: 26

## 2020-10-30 PROCEDURE — 77066 DX MAMMO INCL CAD BI: CPT | Mod: 26

## 2020-10-30 PROCEDURE — 76641 ULTRASOUND BREAST COMPLETE: CPT | Mod: 26,50

## 2020-11-25 ENCOUNTER — TRANSCRIPTION ENCOUNTER (OUTPATIENT)
Age: 40
End: 2020-11-25

## 2021-01-29 ENCOUNTER — TRANSCRIPTION ENCOUNTER (OUTPATIENT)
Age: 41
End: 2021-01-29

## 2021-02-09 ENCOUNTER — EMERGENCY (EMERGENCY)
Facility: HOSPITAL | Age: 41
LOS: 0 days | Discharge: HOME | End: 2021-02-10
Attending: EMERGENCY MEDICINE | Admitting: EMERGENCY MEDICINE
Payer: COMMERCIAL

## 2021-02-09 VITALS
TEMPERATURE: 96 F | DIASTOLIC BLOOD PRESSURE: 67 MMHG | SYSTOLIC BLOOD PRESSURE: 140 MMHG | OXYGEN SATURATION: 100 % | HEIGHT: 67 IN | RESPIRATION RATE: 20 BRPM | HEART RATE: 89 BPM | WEIGHT: 162.04 LBS

## 2021-02-09 DIAGNOSIS — Z90.710 ACQUIRED ABSENCE OF BOTH CERVIX AND UTERUS: ICD-10-CM

## 2021-02-09 DIAGNOSIS — Z90.710 ACQUIRED ABSENCE OF BOTH CERVIX AND UTERUS: Chronic | ICD-10-CM

## 2021-02-09 DIAGNOSIS — Z20.822 CONTACT WITH AND (SUSPECTED) EXPOSURE TO COVID-19: ICD-10-CM

## 2021-02-09 DIAGNOSIS — R07.9 CHEST PAIN, UNSPECIFIED: ICD-10-CM

## 2021-02-09 LAB
ALBUMIN SERPL ELPH-MCNC: 4.4 G/DL — SIGNIFICANT CHANGE UP (ref 3.5–5.2)
ALP SERPL-CCNC: 62 U/L — SIGNIFICANT CHANGE UP (ref 30–115)
ALT FLD-CCNC: 13 U/L — SIGNIFICANT CHANGE UP (ref 0–41)
ANION GAP SERPL CALC-SCNC: 16 MMOL/L — HIGH (ref 7–14)
AST SERPL-CCNC: 23 U/L — SIGNIFICANT CHANGE UP (ref 0–41)
BASOPHILS # BLD AUTO: 0.03 K/UL — SIGNIFICANT CHANGE UP (ref 0–0.2)
BASOPHILS NFR BLD AUTO: 0.5 % — SIGNIFICANT CHANGE UP (ref 0–1)
BILIRUB SERPL-MCNC: 0.5 MG/DL — SIGNIFICANT CHANGE UP (ref 0.2–1.2)
BUN SERPL-MCNC: 15 MG/DL — SIGNIFICANT CHANGE UP (ref 10–20)
CALCIUM SERPL-MCNC: 9.6 MG/DL — SIGNIFICANT CHANGE UP (ref 8.5–10.1)
CHLORIDE SERPL-SCNC: 101 MMOL/L — SIGNIFICANT CHANGE UP (ref 98–110)
CO2 SERPL-SCNC: 20 MMOL/L — SIGNIFICANT CHANGE UP (ref 17–32)
CREAT SERPL-MCNC: 0.7 MG/DL — SIGNIFICANT CHANGE UP (ref 0.7–1.5)
D DIMER BLD IA.RAPID-MCNC: 1923 NG/ML DDU — HIGH (ref 0–230)
EOSINOPHIL # BLD AUTO: 0.14 K/UL — SIGNIFICANT CHANGE UP (ref 0–0.7)
EOSINOPHIL NFR BLD AUTO: 2.1 % — SIGNIFICANT CHANGE UP (ref 0–8)
GLUCOSE SERPL-MCNC: 84 MG/DL — SIGNIFICANT CHANGE UP (ref 70–99)
HCT VFR BLD CALC: 42.7 % — SIGNIFICANT CHANGE UP (ref 37–47)
HGB BLD-MCNC: 14.5 G/DL — SIGNIFICANT CHANGE UP (ref 12–16)
IMM GRANULOCYTES NFR BLD AUTO: 0.2 % — SIGNIFICANT CHANGE UP (ref 0.1–0.3)
LYMPHOCYTES # BLD AUTO: 1.45 K/UL — SIGNIFICANT CHANGE UP (ref 1.2–3.4)
LYMPHOCYTES # BLD AUTO: 22.2 % — SIGNIFICANT CHANGE UP (ref 20.5–51.1)
MAGNESIUM SERPL-MCNC: 2.1 MG/DL — SIGNIFICANT CHANGE UP (ref 1.8–2.4)
MCHC RBC-ENTMCNC: 29.3 PG — SIGNIFICANT CHANGE UP (ref 27–31)
MCHC RBC-ENTMCNC: 34 G/DL — SIGNIFICANT CHANGE UP (ref 32–37)
MCV RBC AUTO: 86.3 FL — SIGNIFICANT CHANGE UP (ref 81–99)
MONOCYTES # BLD AUTO: 0.49 K/UL — SIGNIFICANT CHANGE UP (ref 0.1–0.6)
MONOCYTES NFR BLD AUTO: 7.5 % — SIGNIFICANT CHANGE UP (ref 1.7–9.3)
NEUTROPHILS # BLD AUTO: 4.41 K/UL — SIGNIFICANT CHANGE UP (ref 1.4–6.5)
NEUTROPHILS NFR BLD AUTO: 67.5 % — SIGNIFICANT CHANGE UP (ref 42.2–75.2)
NRBC # BLD: 0 /100 WBCS — SIGNIFICANT CHANGE UP (ref 0–0)
NT-PROBNP SERPL-SCNC: 21 PG/ML — SIGNIFICANT CHANGE UP (ref 0–300)
PLATELET # BLD AUTO: 304 K/UL — SIGNIFICANT CHANGE UP (ref 130–400)
POTASSIUM SERPL-MCNC: 4.7 MMOL/L — SIGNIFICANT CHANGE UP (ref 3.5–5)
POTASSIUM SERPL-SCNC: 4.7 MMOL/L — SIGNIFICANT CHANGE UP (ref 3.5–5)
PROT SERPL-MCNC: 7 G/DL — SIGNIFICANT CHANGE UP (ref 6–8)
RBC # BLD: 4.95 M/UL — SIGNIFICANT CHANGE UP (ref 4.2–5.4)
RBC # FLD: 12 % — SIGNIFICANT CHANGE UP (ref 11.5–14.5)
SODIUM SERPL-SCNC: 137 MMOL/L — SIGNIFICANT CHANGE UP (ref 135–146)
TROPONIN T SERPL-MCNC: <0.01 NG/ML — SIGNIFICANT CHANGE UP
TROPONIN T SERPL-MCNC: <0.01 NG/ML — SIGNIFICANT CHANGE UP
WBC # BLD: 6.53 K/UL — SIGNIFICANT CHANGE UP (ref 4.8–10.8)
WBC # FLD AUTO: 6.53 K/UL — SIGNIFICANT CHANGE UP (ref 4.8–10.8)

## 2021-02-09 PROCEDURE — 71045 X-RAY EXAM CHEST 1 VIEW: CPT | Mod: 26

## 2021-02-09 PROCEDURE — 71275 CT ANGIOGRAPHY CHEST: CPT | Mod: 26

## 2021-02-09 PROCEDURE — 99285 EMERGENCY DEPT VISIT HI MDM: CPT

## 2021-02-09 PROCEDURE — 93010 ELECTROCARDIOGRAM REPORT: CPT

## 2021-02-09 RX ORDER — ACETAMINOPHEN 500 MG
650 TABLET ORAL ONCE
Refills: 0 | Status: COMPLETED | OUTPATIENT
Start: 2021-02-09 | End: 2021-02-09

## 2021-02-09 RX ORDER — FAMOTIDINE 10 MG/ML
20 INJECTION INTRAVENOUS ONCE
Refills: 0 | Status: COMPLETED | OUTPATIENT
Start: 2021-02-09 | End: 2021-02-09

## 2021-02-09 RX ADMIN — Medication 650 MILLIGRAM(S): at 19:49

## 2021-02-09 RX ADMIN — FAMOTIDINE 20 MILLIGRAM(S): 10 INJECTION INTRAVENOUS at 19:49

## 2021-02-09 NOTE — ED ADULT NURSE NOTE - OBJECTIVE STATEMENT
39 y/o female complaint of chest pain. patient states pain started last night after she had a presyncopal episode at home. patient states she had chest pain today while working and decided to be checked out in the ER.

## 2021-02-09 NOTE — ED PROVIDER NOTE - PATIENT PORTAL LINK FT
You can access the FollowMyHealth Patient Portal offered by [facility] by registering at the following website: [email or web address]. By joining [employer]’s FollowMyHealth portal, you will also be able to view your health information using other applications (apps) compatible with our system.

## 2021-02-09 NOTE — ED PROVIDER NOTE - NSFOLLOWUPCLINICS_GEN_ALL_ED_FT
[facility]  [address], [address]  [place] [address]  Phone: [phone]  Fax: [phone]  Follow Up Time: 1-3 Days

## 2021-02-09 NOTE — ED PROVIDER NOTE - NS ED ROS FT
Eyes:  No visual changes, eye pain or discharge.  ENMT:  No hearing changes, pain, discharge or infections. No neck pain or stiffness.  Cardiac: +chest pain. no SOB or edema. No chest pain with exertion.  Respiratory:  No cough or respiratory distress. No hemoptysis. No history of asthma or RAD.  GI:  No nausea, vomiting, diarrhea or abdominal pain.  :  No dysuria, frequency or burning.  MS:  No myalgia, muscle weakness, joint pain or back pain.  Neuro:  No headache or weakness.  No LOC.  Skin:  No skin rash.   Endocrine: No history of thyroid disease or diabetes.

## 2021-02-09 NOTE — ED PROVIDER NOTE - OBJECTIVE STATEMENT
pt is a 40 yof w/ no pmhx here for CP for 2 days. pt states last night she woke up with chest pain and sweating, thought she had a panic attack so she ignored it. today she went to work, and after her shift she wanted to get eval since chest pain persisted. pain is worse with breathing. nothing makes it better, no n/v/d, abd pain, fever, cough

## 2021-02-09 NOTE — ED ADULT NURSE NOTE - NSSUHOSCREENINGYN_ED_ALL_ED
71F pmh morbid obesity, htn, here for PST for scheduled excision of right thigh mass
Yes - the patient is able to be screened

## 2021-02-09 NOTE — ED ADULT NURSE REASSESSMENT NOTE - NS ED NURSE REASSESS COMMENT FT1
Pt refusing bed alarm and safety precautions. Advised and education on importance. PT still refusing. Advised lead at this time

## 2021-02-09 NOTE — ED PROVIDER NOTE - ATTENDING CONTRIBUTION TO CARE
40 F to ED with CP  sx started at home last night, assoc with CP and sweating, No fevers, no sick contacts, no travels, no trauma. 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  AVSS, exam as noted, CTAB, RRR, abdomen soft NTND, (+) bowel sounds, neuro nonfocal.

## 2021-02-10 LAB — SARS-COV-2 RNA SPEC QL NAA+PROBE: SIGNIFICANT CHANGE UP

## 2021-02-17 ENCOUNTER — APPOINTMENT (OUTPATIENT)
Dept: CARDIOLOGY | Facility: CLINIC | Age: 41
End: 2021-02-17
Payer: COMMERCIAL

## 2021-02-17 ENCOUNTER — RESULT CHARGE (OUTPATIENT)
Age: 41
End: 2021-02-17

## 2021-02-17 VITALS
TEMPERATURE: 97.3 F | WEIGHT: 172 LBS | DIASTOLIC BLOOD PRESSURE: 80 MMHG | SYSTOLIC BLOOD PRESSURE: 110 MMHG | HEIGHT: 66 IN | BODY MASS INDEX: 27.64 KG/M2 | HEART RATE: 87 BPM

## 2021-02-17 DIAGNOSIS — R07.9 CHEST PAIN, UNSPECIFIED: ICD-10-CM

## 2021-02-17 PROBLEM — N80.9 ENDOMETRIOSIS, UNSPECIFIED: Chronic | Status: ACTIVE | Noted: 2021-02-09

## 2021-02-17 PROCEDURE — 99072 ADDL SUPL MATRL&STAF TM PHE: CPT

## 2021-02-17 PROCEDURE — 99205 OFFICE O/P NEW HI 60 MIN: CPT

## 2021-02-17 PROCEDURE — 93000 ELECTROCARDIOGRAM COMPLETE: CPT

## 2021-02-17 RX ORDER — CLINDAMYCIN HYDROCHLORIDE 300 MG/1
300 CAPSULE ORAL
Qty: 56 | Refills: 0 | Status: DISCONTINUED | COMMUNITY
Start: 2021-01-09

## 2021-02-17 RX ORDER — ALBUTEROL SULFATE 90 UG/1
108 (90 BASE) INHALANT RESPIRATORY (INHALATION)
Qty: 18 | Refills: 0 | Status: ACTIVE | COMMUNITY
Start: 2020-09-25

## 2021-02-17 RX ORDER — FLUTICASONE FUROATE AND VILANTEROL TRIFENATATE 200; 25 UG/1; UG/1
200-25 POWDER RESPIRATORY (INHALATION)
Qty: 60 | Refills: 0 | Status: DISCONTINUED | COMMUNITY
Start: 2020-09-25

## 2021-02-17 RX ORDER — NORELGESTROMIN AND ETHINYL ESTRADIOL 150; 35 UG/D; UG/D
150-35 PATCH TRANSDERMAL
Qty: 9 | Refills: 0 | Status: DISCONTINUED | COMMUNITY
Start: 2020-10-02

## 2021-02-17 RX ORDER — ACETAMINOPHEN AND CODEINE 300; 30 MG/1; MG/1
300-30 TABLET ORAL
Qty: 6 | Refills: 0 | Status: DISCONTINUED | COMMUNITY
Start: 2021-01-09

## 2021-02-17 RX ORDER — CHLORHEXIDINE GLUCONATE, 0.12% ORAL RINSE 1.2 MG/ML
0.12 SOLUTION DENTAL
Qty: 473 | Refills: 0 | Status: DISCONTINUED | COMMUNITY
Start: 2021-01-09

## 2021-02-17 RX ORDER — IBUPROFEN 800 MG/1
800 TABLET, FILM COATED ORAL
Qty: 32 | Refills: 0 | Status: ACTIVE | COMMUNITY
Start: 2021-01-09

## 2021-02-18 LAB
CHOLEST SERPL-MCNC: 216 MG/DL
DEPRECATED D DIMER PPP IA-ACNC: 171 NG/ML DDU
ESTIMATED AVERAGE GLUCOSE: 111 MG/DL
HBA1C MFR BLD HPLC: 5.5 %
HDLC SERPL-MCNC: 61 MG/DL
LDLC SERPL CALC-MCNC: 155 MG/DL
NONHDLC SERPL-MCNC: 155 MG/DL
SARS-COV-2 IGG SERPL IA-ACNC: 4.75 INDEX
SARS-COV-2 IGG SERPL QL IA: POSITIVE
TRIGL SERPL-MCNC: 76 MG/DL
TSH SERPL-ACNC: 2.88 UIU/ML

## 2021-02-19 ENCOUNTER — APPOINTMENT (OUTPATIENT)
Dept: CARDIOLOGY | Facility: CLINIC | Age: 41
End: 2021-02-19
Payer: COMMERCIAL

## 2021-02-19 ENCOUNTER — APPOINTMENT (OUTPATIENT)
Dept: CARDIOLOGY | Facility: CLINIC | Age: 41
End: 2021-02-19

## 2021-02-19 PROCEDURE — 99072 ADDL SUPL MATRL&STAF TM PHE: CPT

## 2021-02-19 PROCEDURE — 93306 TTE W/DOPPLER COMPLETE: CPT

## 2021-03-05 RX ORDER — METOPROLOL TARTRATE 50 MG/1
50 TABLET, FILM COATED ORAL
Qty: 2 | Refills: 0 | Status: ACTIVE | COMMUNITY
Start: 2021-03-05 | End: 1900-01-01

## 2021-03-08 ENCOUNTER — OUTPATIENT (OUTPATIENT)
Dept: OUTPATIENT SERVICES | Facility: HOSPITAL | Age: 41
LOS: 1 days | Discharge: HOME | End: 2021-03-08
Payer: COMMERCIAL

## 2021-03-08 ENCOUNTER — RESULT REVIEW (OUTPATIENT)
Age: 41
End: 2021-03-08

## 2021-03-08 DIAGNOSIS — Z90.710 ACQUIRED ABSENCE OF BOTH CERVIX AND UTERUS: Chronic | ICD-10-CM

## 2021-03-08 DIAGNOSIS — R79.89 OTHER SPECIFIED ABNORMAL FINDINGS OF BLOOD CHEMISTRY: ICD-10-CM

## 2021-03-08 PROCEDURE — 75574 CT ANGIO HRT W/3D IMAGE: CPT | Mod: 26

## 2021-03-11 ENCOUNTER — APPOINTMENT (OUTPATIENT)
Dept: CARDIOLOGY | Facility: CLINIC | Age: 41
End: 2021-03-11
Payer: COMMERCIAL

## 2021-03-11 VITALS
HEIGHT: 66 IN | BODY MASS INDEX: 27.97 KG/M2 | WEIGHT: 174 LBS | DIASTOLIC BLOOD PRESSURE: 62 MMHG | TEMPERATURE: 97.2 F | SYSTOLIC BLOOD PRESSURE: 100 MMHG

## 2021-03-11 PROCEDURE — 99213 OFFICE O/P EST LOW 20 MIN: CPT

## 2021-03-11 PROCEDURE — 99072 ADDL SUPL MATRL&STAF TM PHE: CPT

## 2021-03-12 ENCOUNTER — APPOINTMENT (OUTPATIENT)
Dept: PULMONOLOGY | Facility: CLINIC | Age: 41
End: 2021-03-12
Payer: COMMERCIAL

## 2021-03-12 VITALS
RESPIRATION RATE: 12 BRPM | BODY MASS INDEX: 27 KG/M2 | SYSTOLIC BLOOD PRESSURE: 130 MMHG | HEIGHT: 66 IN | OXYGEN SATURATION: 97 % | HEART RATE: 110 BPM | DIASTOLIC BLOOD PRESSURE: 90 MMHG | WEIGHT: 168 LBS

## 2021-03-12 DIAGNOSIS — R06.00 DYSPNEA, UNSPECIFIED: ICD-10-CM

## 2021-03-12 DIAGNOSIS — R79.89 OTHER SPECIFIED ABNORMAL FINDINGS OF BLOOD CHEMISTRY: ICD-10-CM

## 2021-03-12 PROCEDURE — 99203 OFFICE O/P NEW LOW 30 MIN: CPT

## 2021-03-12 PROCEDURE — 99072 ADDL SUPL MATRL&STAF TM PHE: CPT

## 2021-03-12 NOTE — ASSESSMENT
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 She has much deconditioning and weight gain.\par The new acute pain may be pleurisy.\par It does not appear that she has a PE.I reviewed all the available xray films.\par  I cannot explain the elevated D dimer at this time.\par \par I feel that she does not need any further w/u at this time. She needs a daily ASA for a few months.\par The pleurisy is usually treated with prednisone and NSAIDS. She is refusing the prednisone at this time.\par \par I would observe her at this point and if there is any worsening of the status she needs a repeat evaluation.

## 2021-03-26 DIAGNOSIS — Z87.59 PERSONAL HISTORY OF OTHER COMPLICATIONS OF PREGNANCY, CHILDBIRTH AND THE PUERPERIUM: ICD-10-CM

## 2021-03-26 DIAGNOSIS — Z86.59 PERSONAL HISTORY OF OTHER COMPLICATIONS OF PREGNANCY, CHILDBIRTH AND THE PUERPERIUM: ICD-10-CM

## 2021-03-26 DIAGNOSIS — R19.8 OTHER SPECIFIED SYMPTOMS AND SIGNS INVOLVING THE DIGESTIVE SYSTEM AND ABDOMEN: ICD-10-CM

## 2021-03-26 DIAGNOSIS — Z87.42 PERSONAL HISTORY OF OTHER DISEASES OF THE FEMALE GENITAL TRACT: ICD-10-CM

## 2021-03-26 DIAGNOSIS — Z80.3 FAMILY HISTORY OF MALIGNANT NEOPLASM OF BREAST: ICD-10-CM

## 2021-03-26 DIAGNOSIS — Z86.59 PERSONAL HISTORY OF OTHER MENTAL AND BEHAVIORAL DISORDERS: ICD-10-CM

## 2021-03-26 DIAGNOSIS — N83.209 UNSPECIFIED OVARIAN CYST, UNSPECIFIED SIDE: ICD-10-CM

## 2021-03-26 DIAGNOSIS — Z87.891 PERSONAL HISTORY OF NICOTINE DEPENDENCE: ICD-10-CM

## 2021-03-26 RX ORDER — SIMETHICONE 80 MG/1
80 TABLET, CHEWABLE ORAL
Refills: 0 | Status: ACTIVE | COMMUNITY

## 2021-03-26 RX ORDER — FLUTICASONE FUROATE AND VILANTEROL TRIFENATATE 200; 25 UG/1; UG/1
200-25 POWDER RESPIRATORY (INHALATION)
Refills: 0 | Status: ACTIVE | COMMUNITY

## 2021-03-26 RX ORDER — BENZONATATE 100 MG/1
100 CAPSULE ORAL
Refills: 0 | Status: ACTIVE | COMMUNITY

## 2021-03-26 RX ORDER — METRONIDAZOLE 500 MG/1
500 TABLET ORAL
Refills: 0 | Status: ACTIVE | COMMUNITY

## 2021-03-26 RX ORDER — DOCUSATE SODIUM 100 MG/1
100 CAPSULE, LIQUID FILLED ORAL
Refills: 0 | Status: ACTIVE | COMMUNITY

## 2021-03-26 RX ORDER — NORELGESTROMIN AND ETHINYL ESTRADIOL 150; 35 UG/D; UG/D
150-35 PATCH TRANSDERMAL
Refills: 0 | Status: ACTIVE | COMMUNITY

## 2021-04-07 ENCOUNTER — APPOINTMENT (OUTPATIENT)
Dept: OBGYN | Facility: CLINIC | Age: 41
End: 2021-04-07

## 2021-05-27 NOTE — ED ADULT NURSE NOTE - PRO INTERPRETER NEED 2
English Render Risk Assessment In Note?: no Note Text (......Xxx Chief Complaint.): This diagnosis correlates with the Link To The Follow Chief Complaint: first Detail Level: Zone

## 2021-06-11 NOTE — ED ADULT TRIAGE NOTE - NSWEIGHTCALCTOOLDRUG_GEN_A_CORE
Anesthesia Evaluation      Patient summary reviewed   No history of anesthetic complications     Airway   Mallampati: I  Neck ROM: full   Pulmonary - negative ROS and normal exam    breath sounds clear to auscultation                         Cardiovascular - negative ROS and normal exam  Rhythm: regular  Rate: normal,         Neuro/Psych    (+) depression, chronic pain    Endo/Other - negative ROS      GI/Hepatic/Renal    Chronic renal disease: stones.     Other findings: H/O DVT      Dental - normal exam                        Anesthesia Plan  Planned anesthetic: general LMA    ASA 2   Induction: intravenous   Anesthetic plan and risks discussed with: patient  Anesthesia plan special considerations: antiemetics,   Post-op plan: routine recovery            used

## 2021-07-23 ENCOUNTER — EMERGENCY (EMERGENCY)
Facility: HOSPITAL | Age: 41
LOS: 0 days | Discharge: HOME | End: 2021-07-23
Attending: EMERGENCY MEDICINE | Admitting: EMERGENCY MEDICINE
Payer: OTHER MISCELLANEOUS

## 2021-07-23 VITALS
OXYGEN SATURATION: 95 % | HEART RATE: 80 BPM | DIASTOLIC BLOOD PRESSURE: 68 MMHG | SYSTOLIC BLOOD PRESSURE: 109 MMHG | RESPIRATION RATE: 18 BRPM | HEIGHT: 67 IN | WEIGHT: 156.97 LBS | TEMPERATURE: 99 F

## 2021-07-23 VITALS — WEIGHT: 156.97 LBS
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
## 2021-07-23 DIAGNOSIS — Z87.448 PERSONAL HISTORY OF OTHER DISEASES OF URINARY SYSTEM: ICD-10-CM
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
## 2021-07-23 DIAGNOSIS — S63.501A UNSPECIFIED SPRAIN OF RIGHT WRIST, INITIAL ENCOUNTER: ICD-10-CM

## 2021-07-23 DIAGNOSIS — Z90.710 ACQUIRED ABSENCE OF BOTH CERVIX AND UTERUS: Chronic | ICD-10-CM

## 2021-07-23 DIAGNOSIS — M25.531 PAIN IN RIGHT WRIST: ICD-10-CM

## 2021-07-23 DIAGNOSIS — Z79.899 OTHER LONG TERM (CURRENT) DRUG THERAPY: ICD-10-CM

## 2021-07-23 DIAGNOSIS — Z90.89 ACQUIRED ABSENCE OF OTHER ORGANS: ICD-10-CM
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
## 2021-07-23 DIAGNOSIS — Z87.42 PERSONAL HISTORY OF OTHER DISEASES OF THE FEMALE GENITAL TRACT: ICD-10-CM

## 2021-07-23 PROCEDURE — 99283 EMERGENCY DEPT VISIT LOW MDM: CPT | Mod: 25

## 2021-07-23 PROCEDURE — 29125 APPL SHORT ARM SPLINT STATIC: CPT

## 2021-07-23 PROCEDURE — 73110 X-RAY EXAM OF WRIST: CPT | Mod: 26,RT

## 2021-07-23 RX ORDER — IBUPROFEN 200 MG
600 TABLET ORAL ONCE
Refills: 0 | Status: COMPLETED | OUTPATIENT
Start: 2021-07-23 | End: 2021-07-23

## 2021-07-23 RX ADMIN — Medication 600 MILLIGRAM(S): at 19:42
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
## 2021-07-23 NOTE — ED PROVIDER NOTE - CARE PROVIDER_API CALL
Mitchel Arroyo (MD)  Orthopaedic Surgery  [address] [place] [address]  Phone: [phone]  Fax: [phone]  Follow Up Time:
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
## 2021-07-23 NOTE — ED PROVIDER NOTE - PATIENT PORTAL LINK FT
You can access the FollowMyHealth Patient Portal offered by [facility] by registering at the following website: [email or web address]. By joining [employer]’s FollowMyHealth portal, you will also be able to view your health information using other applications (apps) compatible with our system.

## 2021-07-23 NOTE — ED ADULT NURSE NOTE - NSIMPLEMENTINTERV_GEN_ALL_ED
Implemented All Universal Safety Interventions:  [place] to call system. Call bell, personal items and telephone within reach. Instruct patient to call for assistance. Room bathroom lighting operational. Non-slip footwear when patient is off stretcher. Physically safe environment: no spills, clutter or unnecessary equipment. Stretcher in lowest position, wheels locked, appropriate side rails in place.

## 2021-07-23 NOTE — ED PROCEDURE NOTE - ATTENDING CONTRIBUTION TO CARE
I was present for and supervised the key and critical aspects of the procedures performed during the care of the patient. I was present for and supervised the key and critical aspects of the procedures performed during the care of the patient..

## 2021-11-11 ENCOUNTER — TRANSCRIPTION ENCOUNTER (OUTPATIENT)
Age: 41
End: 2021-11-11

## 2021-12-15 ENCOUNTER — TRANSCRIPTION ENCOUNTER (OUTPATIENT)
Age: 41
End: 2021-12-15

## 2021-12-15 ENCOUNTER — EMERGENCY (EMERGENCY)
Facility: HOSPITAL | Age: 41
LOS: 0 days | Discharge: HOME | End: 2021-12-15
Attending: EMERGENCY MEDICINE | Admitting: EMERGENCY MEDICINE
Payer: COMMERCIAL

## 2021-12-15 VITALS
RESPIRATION RATE: 18 BRPM | HEIGHT: 67 IN | OXYGEN SATURATION: 98 % | TEMPERATURE: 98 F | SYSTOLIC BLOOD PRESSURE: 111 MMHG | HEART RATE: 89 BPM | DIASTOLIC BLOOD PRESSURE: 71 MMHG | WEIGHT: 169.98 LBS

## 2021-12-15 DIAGNOSIS — Z90.710 ACQUIRED ABSENCE OF BOTH CERVIX AND UTERUS: Chronic | ICD-10-CM

## 2021-12-15 DIAGNOSIS — Z87.891 PERSONAL HISTORY OF NICOTINE DEPENDENCE: ICD-10-CM

## 2021-12-15 DIAGNOSIS — R07.9 CHEST PAIN, UNSPECIFIED: ICD-10-CM

## 2021-12-15 DIAGNOSIS — Z86.16 PERSONAL HISTORY OF COVID-19: ICD-10-CM

## 2021-12-15 LAB
ALBUMIN SERPL ELPH-MCNC: 4.6 G/DL — SIGNIFICANT CHANGE UP (ref 3.5–5.2)
ALP SERPL-CCNC: 68 U/L — SIGNIFICANT CHANGE UP (ref 30–115)
ALT FLD-CCNC: 9 U/L — SIGNIFICANT CHANGE UP (ref 0–41)
ANION GAP SERPL CALC-SCNC: 16 MMOL/L — HIGH (ref 7–14)
AST SERPL-CCNC: 13 U/L — SIGNIFICANT CHANGE UP (ref 0–41)
BASOPHILS # BLD AUTO: 0.04 K/UL — SIGNIFICANT CHANGE UP (ref 0–0.2)
BASOPHILS NFR BLD AUTO: 0.7 % — SIGNIFICANT CHANGE UP (ref 0–1)
BILIRUB SERPL-MCNC: 0.5 MG/DL — SIGNIFICANT CHANGE UP (ref 0.2–1.2)
BUN SERPL-MCNC: 16 MG/DL — SIGNIFICANT CHANGE UP (ref 10–20)
CALCIUM SERPL-MCNC: 9.7 MG/DL — SIGNIFICANT CHANGE UP (ref 8.5–10.1)
CHLORIDE SERPL-SCNC: 101 MMOL/L — SIGNIFICANT CHANGE UP (ref 98–110)
CO2 SERPL-SCNC: 19 MMOL/L — SIGNIFICANT CHANGE UP (ref 17–32)
CREAT SERPL-MCNC: 0.9 MG/DL — SIGNIFICANT CHANGE UP (ref 0.7–1.5)
D DIMER BLD IA.RAPID-MCNC: 141 NG/ML DDU — SIGNIFICANT CHANGE UP (ref 0–230)
EOSINOPHIL # BLD AUTO: 0.23 K/UL — SIGNIFICANT CHANGE UP (ref 0–0.7)
EOSINOPHIL NFR BLD AUTO: 3.8 % — SIGNIFICANT CHANGE UP (ref 0–8)
GLUCOSE SERPL-MCNC: 89 MG/DL — SIGNIFICANT CHANGE UP (ref 70–99)
HCT VFR BLD CALC: 43 % — SIGNIFICANT CHANGE UP (ref 37–47)
HGB BLD-MCNC: 14.6 G/DL — SIGNIFICANT CHANGE UP (ref 12–16)
IMM GRANULOCYTES NFR BLD AUTO: 0.3 % — SIGNIFICANT CHANGE UP (ref 0.1–0.3)
LYMPHOCYTES # BLD AUTO: 1.64 K/UL — SIGNIFICANT CHANGE UP (ref 1.2–3.4)
LYMPHOCYTES # BLD AUTO: 26.9 % — SIGNIFICANT CHANGE UP (ref 20.5–51.1)
MCHC RBC-ENTMCNC: 29.1 PG — SIGNIFICANT CHANGE UP (ref 27–31)
MCHC RBC-ENTMCNC: 34 G/DL — SIGNIFICANT CHANGE UP (ref 32–37)
MCV RBC AUTO: 85.7 FL — SIGNIFICANT CHANGE UP (ref 81–99)
MONOCYTES # BLD AUTO: 0.49 K/UL — SIGNIFICANT CHANGE UP (ref 0.1–0.6)
MONOCYTES NFR BLD AUTO: 8 % — SIGNIFICANT CHANGE UP (ref 1.7–9.3)
NEUTROPHILS # BLD AUTO: 3.68 K/UL — SIGNIFICANT CHANGE UP (ref 1.4–6.5)
NEUTROPHILS NFR BLD AUTO: 60.3 % — SIGNIFICANT CHANGE UP (ref 42.2–75.2)
NRBC # BLD: 0 /100 WBCS — SIGNIFICANT CHANGE UP (ref 0–0)
PLATELET # BLD AUTO: 285 K/UL — SIGNIFICANT CHANGE UP (ref 130–400)
POTASSIUM SERPL-MCNC: 4.2 MMOL/L — SIGNIFICANT CHANGE UP (ref 3.5–5)
POTASSIUM SERPL-SCNC: 4.2 MMOL/L — SIGNIFICANT CHANGE UP (ref 3.5–5)
PROT SERPL-MCNC: 7.2 G/DL — SIGNIFICANT CHANGE UP (ref 6–8)
RBC # BLD: 5.02 M/UL — SIGNIFICANT CHANGE UP (ref 4.2–5.4)
RBC # FLD: 12 % — SIGNIFICANT CHANGE UP (ref 11.5–14.5)
SODIUM SERPL-SCNC: 136 MMOL/L — SIGNIFICANT CHANGE UP (ref 135–146)
TROPONIN T SERPL-MCNC: <0.01 NG/ML — SIGNIFICANT CHANGE UP
WBC # BLD: 6.1 K/UL — SIGNIFICANT CHANGE UP (ref 4.8–10.8)
WBC # FLD AUTO: 6.1 K/UL — SIGNIFICANT CHANGE UP (ref 4.8–10.8)

## 2021-12-15 PROCEDURE — 71045 X-RAY EXAM CHEST 1 VIEW: CPT | Mod: 26

## 2021-12-15 PROCEDURE — 99285 EMERGENCY DEPT VISIT HI MDM: CPT

## 2021-12-15 PROCEDURE — 93010 ELECTROCARDIOGRAM REPORT: CPT

## 2021-12-15 NOTE — ED PROVIDER NOTE - CARE PROVIDER_API CALL
Jaime Tena  Cardiology  [address] [place] [id number]  Phone: [phone]  Fax: [phone]  Follow Up Time:

## 2021-12-15 NOTE — ED PROVIDER NOTE - CLINICAL SUMMARY MEDICAL DECISION MAKING FREE TEXT BOX
Pt with complaints of Cp and SOB. Required ekg, labs, imaging and monitor. No acute findings. WIll dc with outpt f/up

## 2021-12-15 NOTE — ED PROVIDER NOTE - PHYSICAL EXAMINATION
Physical Exam    Vital Signs: I have reviewed the initial vital signs.  Constitutional: well-nourished, appears stated age, no acute distress  Eyes: Conjunctiva pink, Sclera clear,   Cardiovascular: S1 and S2, regular rate, regular rhythm, well-perfused extremities, radial pulses equal and 2+, calves nonttp, equal in size  Respiratory: unlabored respiratory effort, speaking in full sentences, handling oral secretions, clear to auscultation bilaterally no wheezing, rales and rhonchi  Gastrointestinal: soft, non-tender abdomen, no pulsatile mass, normal bowl sounds no cvat b/l  Musculoskeletal: supple neck, no lower extremity edema,  Integumentary: warm, dry, no rashes, lacerations,  Neurologic: awake, alert x 3

## 2021-12-15 NOTE — ED PROVIDER NOTE - NSFOLLOWUPCLINICS_GEN_ALL_ED_FT
[facility]  [facility] [place] [address]  Phone: [phone]  Fax:   Follow Up Time: Urgent

## 2021-12-15 NOTE — ED PROVIDER NOTE - PATIENT PORTAL LINK FT
You can access the FollowMyHealth Patient Portal offered by [facility] by registering at the following website: [email or web address]. By joining [employer]’s FollowMyHealth portal, you will also be able to view your health information using other applications (apps) compatible with our system.

## 2021-12-15 NOTE — ED PROVIDER NOTE - ATTENDING CONTRIBUTION TO CARE
I personally evaluated the patient. I reviewed the Resident’s or Physician Assistant’s note (as assigned above), and agree with the findings and plan except as documented in my note.  Pt with hx of chronic post covid cardiopulmonary symptoms presents with 3 days of left sided cp pain radiating to the left shoulder and associated exertional SOB. No lightheadedness, LOC, LE pain or swelling. + ex smoker quit 13 yrs ago. Pt had normal CCTA several months ago. VS reviewed, pt non-toxic appearing, NAD. Head ncat, MMM, neck supple, normal ROM, normal s1s2 without any murmurs, Lungs CTAB with normal work of breathing. abd +BS, s/nd/nt, extremities wnl, neuro exam grossly normal. No acute skin rashes. Plan is ekg, monitor, labs, imaging, meds as needed and reassess.

## 2021-12-15 NOTE — ED PROVIDER NOTE - PROGRESS NOTE DETAILS
dc: cp resolved in ER. Results discussed. Pt understands to f/u with [facility] covid recovery clinic and cardiologist again.

## 2021-12-15 NOTE — ED PROVIDER NOTE - OBJECTIVE STATEMENT
41 y.o. F, no pmh other than COVID19 last year with intermittent cp. Has been evaluated by cardiolgist, pulm specialist and post covid recovery clinic at [facility]. Having cp x 3 days, intermittent L sided pain non radiating. No fever chills pleurisy sob palpitations calf swelling , no smoking or OCPs or recent surgeries

## 2021-12-15 NOTE — ED ADULT NURSE NOTE - OBJECTIVE STATEMENT
pt c/o chest pain and sob for 2 days. pt came in today because she states she felt " weakness and lightheaded" pt a&ox4.

## 2021-12-15 NOTE — ED PROVIDER NOTE - NSFOLLOWUPINSTRUCTIONS_ED_ALL_ED_FT
1) MAKE AN APPOINTMENT WITH THE CARDIOLOGIST AND THE [facility].  2) RETURN TO THE ER IF YOUR PAIN WORSENS , YOUR HEART RATE IS CONSISTENTLY OVER 100 AT REST OR YOU HAVE DIFFICULTY BREATHING.    Chest Pain    WHAT YOU NEED TO KNOW:    Chest pain can be caused by a range of conditions, from not serious to life-threatening. Chest pain can be a symptom of a digestive problem, such as acid reflux or a stomach ulcer. An anxiety attack or a strong emotion, such as anger, can also cause chest pain. Infection, inflammation, or a fracture in the bones or cartilage in your chest can cause pain or discomfort. Sometimes chest pain or pressure is caused by poor blood flow to your heart (angina). Chest pain may also be caused by life-threatening conditions such as a heart attack or blood clot in your lungs.     DISCHARGE INSTRUCTIONS:    Call 911 if:     You have any of the following signs of a heart attack:   Squeezing, pressure, or pain in your chest       and any of the following:   Discomfort or pain in your back, neck, jaw, stomach, or arm       Shortness of breath      Nausea or vomiting      Lightheadedness or a sudden cold sweat        Return to the emergency department if:     You have chest discomfort that gets worse, even with medicine.      You cough or vomit blood.       Your bowel movements are black or bloody.       You cannot stop vomiting, or it hurts to swallow.     Contact your healthcare provider if:     You have questions or concerns about your condition or care.        Medicines:     Medicines may be given to treat the cause of your chest pain. Examples include pain medicine, anxiety medicine, or medicines to increase blood flow to your heart.       Do not take certain medicines without asking your healthcare provider first. These include NSAIDs, herbal or vitamin supplements, or hormones (estrogen or progestin).       Take your medicine as directed. Contact your healthcare provider if you think your medicine is not helping or if you have side effects. Tell him or her if you are allergic to any medicine. Keep a list of the medicines, vitamins, and herbs you take. Include the amounts, and when and why you take them. Bring the list or the pill bottles to follow-up visits. Carry your medicine list with you in case of an emergency.    Follow up with your healthcare provider within 72 hours, or as directed: You may need to return for more tests to find the cause of your chest pain. You may be referred to a specialist, such as a cardiologist or gastroenterologist. Write down your questions so you remember to ask them during your visits.     Healthy living tips: The following are general healthy guidelines. If your chest pain is caused by a heart problem, your healthcare provider will give you specific guidelines to follow.    Do not smoke. Nicotine and other chemicals in cigarettes and cigars can cause lung and heart damage. Ask your healthcare provider for information if you currently smoke and need help to quit. E-cigarettes or smokeless tobacco still contain nicotine. Talk to your healthcare provider before you use these products.       Eat a variety of healthy, low-fat, low-salt foods. Healthy foods include fruits, vegetables, whole-grain breads, low-fat dairy products, beans, lean meats, and fish. Ask for more information about a heart healthy diet.      Drink plenty of water every day. Your body is made of mostly water. Water helps your body to control your temperature and blood pressure. Ask your healthcare provider how much water you should drink every day.      Ask about activity. Your healthcare provider will tell you which activities to limit or avoid. Ask when you can drive, return to work, and have sex. Ask about the best exercise plan for you.      Maintain a healthy weight. Ask your healthcare provider how much you should weigh. Ask him or her to help you create a weight loss plan if you are overweight.       Get the flu and pneumonia vaccines. All adults should get the influenza (flu) vaccine. Get it every year as soon as it becomes available. The pneumococcal vaccine is given to adults aged 65 years or older. The vaccine is given every 5 years to prevent pneumococcal disease, such as pneumonia.    If you have a stent:     Carry your stent card with you at all times.       Let all healthcare providers know that you have a stent.          © Copyright [employer] 2019 All illustrations and images included in CareNotes are the copyrighted property of [employer] or [employer].

## 2022-01-13 ENCOUNTER — TRANSCRIPTION ENCOUNTER (OUTPATIENT)
Age: 42
End: 2022-01-13

## 2022-05-09 ENCOUNTER — APPOINTMENT (OUTPATIENT)
Dept: OBGYN | Facility: CLINIC | Age: 42
End: 2022-05-09
Payer: COMMERCIAL

## 2022-05-09 ENCOUNTER — EMERGENCY (EMERGENCY)
Facility: HOSPITAL | Age: 42
LOS: 0 days | Discharge: HOME | End: 2022-05-10
Attending: EMERGENCY MEDICINE | Admitting: EMERGENCY MEDICINE
Payer: COMMERCIAL

## 2022-05-09 VITALS
HEIGHT: 66 IN | DIASTOLIC BLOOD PRESSURE: 70 MMHG | HEART RATE: 66 BPM | SYSTOLIC BLOOD PRESSURE: 102 MMHG | BODY MASS INDEX: 27 KG/M2 | WEIGHT: 168 LBS

## 2022-05-09 VITALS
HEART RATE: 76 BPM | OXYGEN SATURATION: 100 % | DIASTOLIC BLOOD PRESSURE: 74 MMHG | WEIGHT: 167.99 LBS | SYSTOLIC BLOOD PRESSURE: 130 MMHG | TEMPERATURE: 97 F | RESPIRATION RATE: 20 BRPM | HEIGHT: 67 IN

## 2022-05-09 DIAGNOSIS — Z90.710 ACQUIRED ABSENCE OF BOTH CERVIX AND UTERUS: ICD-10-CM

## 2022-05-09 DIAGNOSIS — R10.2 PELVIC AND PERINEAL PAIN: ICD-10-CM

## 2022-05-09 DIAGNOSIS — Z90.710 ACQUIRED ABSENCE OF BOTH CERVIX AND UTERUS: Chronic | ICD-10-CM

## 2022-05-09 DIAGNOSIS — R10.32 LEFT LOWER QUADRANT PAIN: ICD-10-CM

## 2022-05-09 DIAGNOSIS — N83.202 UNSPECIFIED OVARIAN CYST, LEFT SIDE: ICD-10-CM

## 2022-05-09 DIAGNOSIS — Z87.891 PERSONAL HISTORY OF NICOTINE DEPENDENCE: ICD-10-CM

## 2022-05-09 DIAGNOSIS — N93.9 ABNORMAL UTERINE AND VAGINAL BLEEDING, UNSPECIFIED: ICD-10-CM

## 2022-05-09 DIAGNOSIS — N89.8 OTHER SPECIFIED NONINFLAMMATORY DISORDERS OF VAGINA: ICD-10-CM

## 2022-05-09 DIAGNOSIS — R11.2 NAUSEA WITH VOMITING, UNSPECIFIED: ICD-10-CM

## 2022-05-09 DIAGNOSIS — Z90.49 ACQUIRED ABSENCE OF OTHER SPECIFIED PARTS OF DIGESTIVE TRACT: ICD-10-CM

## 2022-05-09 LAB
ALBUMIN SERPL ELPH-MCNC: 4.4 G/DL — SIGNIFICANT CHANGE UP (ref 3.5–5.2)
ALP SERPL-CCNC: 53 U/L — SIGNIFICANT CHANGE UP (ref 30–115)
ALT FLD-CCNC: 8 U/L — SIGNIFICANT CHANGE UP (ref 0–41)
ANION GAP SERPL CALC-SCNC: 12 MMOL/L — SIGNIFICANT CHANGE UP (ref 7–14)
APPEARANCE UR: CLEAR — SIGNIFICANT CHANGE UP
AST SERPL-CCNC: 14 U/L — SIGNIFICANT CHANGE UP (ref 0–41)
BASOPHILS # BLD AUTO: 0.04 K/UL — SIGNIFICANT CHANGE UP (ref 0–0.2)
BASOPHILS NFR BLD AUTO: 0.7 % — SIGNIFICANT CHANGE UP (ref 0–1)
BILIRUB SERPL-MCNC: 0.5 MG/DL — SIGNIFICANT CHANGE UP (ref 0.2–1.2)
BILIRUB UR QL STRIP: NORMAL
BILIRUB UR-MCNC: NEGATIVE — SIGNIFICANT CHANGE UP
BUN SERPL-MCNC: 15 MG/DL — SIGNIFICANT CHANGE UP (ref 10–20)
CALCIUM SERPL-MCNC: 9.9 MG/DL — SIGNIFICANT CHANGE UP (ref 8.5–10.1)
CHLORIDE SERPL-SCNC: 101 MMOL/L — SIGNIFICANT CHANGE UP (ref 98–110)
CLARITY UR: CLEAR
CO2 SERPL-SCNC: 23 MMOL/L — SIGNIFICANT CHANGE UP (ref 17–32)
COLLECTION METHOD: NORMAL
COLOR SPEC: SIGNIFICANT CHANGE UP
CREAT SERPL-MCNC: 0.6 MG/DL — LOW (ref 0.7–1.5)
DIFF PNL FLD: NEGATIVE — SIGNIFICANT CHANGE UP
EGFR: 116 ML/MIN/1.73M2 — SIGNIFICANT CHANGE UP
EOSINOPHIL # BLD AUTO: 0.21 K/UL — SIGNIFICANT CHANGE UP (ref 0–0.7)
EOSINOPHIL NFR BLD AUTO: 3.6 % — SIGNIFICANT CHANGE UP (ref 0–8)
GLUCOSE SERPL-MCNC: 80 MG/DL — SIGNIFICANT CHANGE UP (ref 70–99)
GLUCOSE UR QL: NEGATIVE — SIGNIFICANT CHANGE UP
GLUCOSE UR-MCNC: NORMAL
HCG SERPL QL: NEGATIVE — SIGNIFICANT CHANGE UP
HCG UR QL: 0.2 EU/DL
HCT VFR BLD CALC: 41.3 % — SIGNIFICANT CHANGE UP (ref 37–47)
HGB BLD-MCNC: 14.1 G/DL — SIGNIFICANT CHANGE UP (ref 12–16)
HGB UR QL STRIP.AUTO: NORMAL
IMM GRANULOCYTES NFR BLD AUTO: 0.2 % — SIGNIFICANT CHANGE UP (ref 0.1–0.3)
KETONES UR-MCNC: NEGATIVE — SIGNIFICANT CHANGE UP
KETONES UR-MCNC: NORMAL
LEUKOCYTE ESTERASE UR QL STRIP: NORMAL
LEUKOCYTE ESTERASE UR-ACNC: NEGATIVE — SIGNIFICANT CHANGE UP
LYMPHOCYTES # BLD AUTO: 1.44 K/UL — SIGNIFICANT CHANGE UP (ref 1.2–3.4)
LYMPHOCYTES # BLD AUTO: 24.9 % — SIGNIFICANT CHANGE UP (ref 20.5–51.1)
MCHC RBC-ENTMCNC: 29.3 PG — SIGNIFICANT CHANGE UP (ref 27–31)
MCHC RBC-ENTMCNC: 34.1 G/DL — SIGNIFICANT CHANGE UP (ref 32–37)
MCV RBC AUTO: 85.9 FL — SIGNIFICANT CHANGE UP (ref 81–99)
MONOCYTES # BLD AUTO: 0.43 K/UL — SIGNIFICANT CHANGE UP (ref 0.1–0.6)
MONOCYTES NFR BLD AUTO: 7.4 % — SIGNIFICANT CHANGE UP (ref 1.7–9.3)
NEUTROPHILS # BLD AUTO: 3.66 K/UL — SIGNIFICANT CHANGE UP (ref 1.4–6.5)
NEUTROPHILS NFR BLD AUTO: 63.2 % — SIGNIFICANT CHANGE UP (ref 42.2–75.2)
NITRITE UR QL STRIP: NORMAL
NITRITE UR-MCNC: NEGATIVE — SIGNIFICANT CHANGE UP
NRBC # BLD: 0 /100 WBCS — SIGNIFICANT CHANGE UP (ref 0–0)
PH UR STRIP: 5.5
PH UR: 6 — SIGNIFICANT CHANGE UP (ref 5–8)
PLATELET # BLD AUTO: 292 K/UL — SIGNIFICANT CHANGE UP (ref 130–400)
POTASSIUM SERPL-MCNC: 4.4 MMOL/L — SIGNIFICANT CHANGE UP (ref 3.5–5)
POTASSIUM SERPL-SCNC: 4.4 MMOL/L — SIGNIFICANT CHANGE UP (ref 3.5–5)
PROT SERPL-MCNC: 7 G/DL — SIGNIFICANT CHANGE UP (ref 6–8)
PROT UR STRIP-MCNC: NORMAL
PROT UR-MCNC: NEGATIVE — SIGNIFICANT CHANGE UP
RBC # BLD: 4.81 M/UL — SIGNIFICANT CHANGE UP (ref 4.2–5.4)
RBC # FLD: 12.4 % — SIGNIFICANT CHANGE UP (ref 11.5–14.5)
SODIUM SERPL-SCNC: 136 MMOL/L — SIGNIFICANT CHANGE UP (ref 135–146)
SP GR SPEC: 1.02 — SIGNIFICANT CHANGE UP (ref 1.01–1.03)
SP GR UR STRIP: 1.03
UROBILINOGEN FLD QL: SIGNIFICANT CHANGE UP
WBC # BLD: 5.79 K/UL — SIGNIFICANT CHANGE UP (ref 4.8–10.8)
WBC # FLD AUTO: 5.79 K/UL — SIGNIFICANT CHANGE UP (ref 4.8–10.8)

## 2022-05-09 PROCEDURE — 76830 TRANSVAGINAL US NON-OB: CPT

## 2022-05-09 PROCEDURE — 99214 OFFICE O/P EST MOD 30 MIN: CPT | Mod: 25

## 2022-05-09 PROCEDURE — G1004: CPT

## 2022-05-09 PROCEDURE — 99285 EMERGENCY DEPT VISIT HI MDM: CPT

## 2022-05-09 PROCEDURE — 81003 URINALYSIS AUTO W/O SCOPE: CPT | Mod: QW

## 2022-05-09 PROCEDURE — 74177 CT ABD & PELVIS W/CONTRAST: CPT | Mod: 26,ME

## 2022-05-09 RX ORDER — FAMOTIDINE 10 MG/ML
20 INJECTION INTRAVENOUS ONCE
Refills: 0 | Status: COMPLETED | OUTPATIENT
Start: 2022-05-09 | End: 2022-05-09

## 2022-05-09 RX ORDER — ACETAMINOPHEN 500 MG
650 TABLET ORAL ONCE
Refills: 0 | Status: COMPLETED | OUTPATIENT
Start: 2022-05-09 | End: 2022-05-09

## 2022-05-09 RX ORDER — KETOROLAC TROMETHAMINE 30 MG/ML
30 SYRINGE (ML) INJECTION ONCE
Refills: 0 | Status: DISCONTINUED | OUTPATIENT
Start: 2022-05-09 | End: 2022-05-09

## 2022-05-09 RX ORDER — SODIUM CHLORIDE 9 MG/ML
1000 INJECTION INTRAMUSCULAR; INTRAVENOUS; SUBCUTANEOUS ONCE
Refills: 0 | Status: COMPLETED | OUTPATIENT
Start: 2022-05-09 | End: 2022-05-09

## 2022-05-09 RX ADMIN — Medication 30 MILLIGRAM(S): at 19:43

## 2022-05-09 RX ADMIN — FAMOTIDINE 104 MILLIGRAM(S): 10 INJECTION INTRAVENOUS at 19:43

## 2022-05-09 RX ADMIN — Medication 325 MILLIGRAM(S): at 22:18

## 2022-05-09 RX ADMIN — SODIUM CHLORIDE 1000 MILLILITER(S): 9 INJECTION INTRAMUSCULAR; INTRAVENOUS; SUBCUTANEOUS at 22:19

## 2022-05-09 RX ADMIN — Medication 30 MILLIGRAM(S): at 23:15

## 2022-05-09 RX ADMIN — SODIUM CHLORIDE 1000 MILLILITER(S): 9 INJECTION INTRAMUSCULAR; INTRAVENOUS; SUBCUTANEOUS at 19:43

## 2022-05-09 RX ADMIN — Medication 650 MILLIGRAM(S): at 23:01

## 2022-05-09 NOTE — ED PROVIDER NOTE - PATIENT PORTAL LINK FT
You can access the FollowMyHealth Patient Portal offered by [facility] by registering at the following website: [email or web address]. By joining [employer]’s FollowMyHealth portal, you will also be able to view your health information using other applications (apps) compatible with our system.

## 2022-05-09 NOTE — ED ADULT NURSE NOTE - NSIMPLEMENTINTERV_GEN_ALL_ED
Implemented All Fall Risk Interventions:  [place] to call system. Call bell, personal items and telephone within reach. Instruct patient to call for assistance. Room bathroom lighting operational. Non-slip footwear when patient is off stretcher. Physically safe environment: no spills, clutter or unnecessary equipment. Stretcher in lowest position, wheels locked, appropriate side rails in place. Provide visual cue, wrist band, yellow gown, etc. Monitor gait and stability. Monitor for mental status changes and reorient to person, place, and time. Review medications for side effects contributing to fall risk. Reinforce activity limits and safety measures with patient and family.

## 2022-05-09 NOTE — ED PROVIDER NOTE - CARE PROVIDER_API CALL
Brendon Michelle)  Obstetrics and Gynecology  [facility] [place] [address]  Phone: [phone]  Fax: [phone]  Follow Up Time: 1-3 Days

## 2022-05-09 NOTE — ED PROVIDER NOTE - CARE PLAN
1 Principal Discharge DX:	Abdominal pain   Principal Discharge DX:	Abdominal pain  Secondary Diagnosis:	Left ovarian cyst

## 2022-05-09 NOTE — REVIEW OF SYSTEMS
[Abdominal Pain] : abdominal pain [Vomiting] : vomiting [Nausea] : nausea [Negative] : Heme/Lymph [Constipation] : no constipation [Diarrhea] : diarrhea

## 2022-05-09 NOTE — ED ADULT TRIAGE NOTE - CHIEF COMPLAINT QUOTE
llq abdominal pain onset 1 week ago patient went to gyn to r/o ovarain cyst . sent in for R/o diverticulitis. patient vomiting in triage

## 2022-05-09 NOTE — ED PROVIDER NOTE - OBJECTIVE STATEMENT
41-year-old female with past medical history hysterectomy (ovaries still intact/tubes and cervix removed along with uterus), appendectomy, frequent ovarian cysts, presents to the ED for left lower quadrant/pelvic pain today.  Patient states pain is extremely sharp and severe at this point started off as a burning/pressure kind of pain.  Associated with nausea/vomiting X several times over the past several days.  Went to go see her GYN who did a intravaginal ultrasound and was told there was no ovarian pathology noted.  Does have some vaginal discharge that is not purulent, or itchy.  Her GYN noted this, no specific treatment started.  Patient sent to ER as she was having sharp pain in the GYN office.  Patient denies any diarrhea, dysuria, palpitations, shortness of breath, chest pain, cough, headache, leg swelling.  Does have some radiation pain now to the left flank area.  Denies any hematuria, urine frequency.    ALL: nkda  Meds [id number] since friday (upto 2400 mg daily)  SH quit smoking years ago, ETOH occ  PMD Jossie Randall 41-year-old female with past medical history hysterectomy (ovaries still intact/tubes and cervix removed along with uterus), appendectomy, frequent ovarian cysts, presents to the ED for left lower quadrant/pelvic pain today.  Patient states pain is extremely sharp and severe at this point started off as a burning/pressure kind of pain.  Associated with nausea/vomiting X several times over the past several days.  Went to go see her GYN who did a intravaginal ultrasound and was told there was no ovarian pathology noted.  Does have some vaginal discharge that is not purulent, or itchy.  Her GYN noted this, no specific treatment started.  Patient sent to ER to r/o diverticulitis as she was having sharp LLQ pain in the GYN office.  Patient denies any current diarrhea, dysuria, palpitations, shortness of breath, chest pain, cough, headache, leg swelling.  Does have some radiation of pain now to the left flank area.  Denies any hematuria, urine frequency.    ALL: nkda  Meds [id number] since friday (up to 2400 mg daily)  SH quit smoking years ago, ETOH occ  PMD Jossie Randall

## 2022-05-09 NOTE — ED PROVIDER NOTE - PHYSICAL EXAMINATION
VITAL SIGNS: I have reviewed nursing notes and confirm.  CONSTITUTIONAL: Well-developed; well-nourished; in no acute distress.  SKIN: Skin exam is warm and dry, no acute rash.  HEAD: Normocephalic; atraumatic.  EYES: PERRL, EOM intact; conjunctiva and sclera clear.  ENT: No nasal discharge; airway clear.   NECK: Supple; non tender.  CARD: S1, S2 normal; no murmurs, gallops, or rubs. Regular rate and rhythm.  RESP: No wheezes, rales or rhonchi.  ABD: Normal bowel sounds; soft; non-distended; +LLQ tenderness, +LEFT flank tenderness; no hepatosplenomegaly.  EXT: Normal ROM. No clubbing, cyanosis or edema.  LYMPH: No acute cervical adenopathy.  NEURO: Alert, oriented. Grossly unremarkable. No focal deficits.  PSYCH: Cooperative, appropriate.

## 2022-05-09 NOTE — PROCEDURE
[Pelvic Pain] : pelvic pain [Transvaginal Ultrasound] : transvaginal ultrasound [Absent] : absent [FreeTextEntry7] : not visualized [FreeTextEntry8] : 4.28cc   nl  [FreeTextEntry6] : no free fluid identified

## 2022-05-09 NOTE — ED PROVIDER NOTE - CLINICAL SUMMARY MEDICAL DECISION MAKING FREE TEXT BOX
41-year-old female with past medical history hysterectomy (ovaries still intact/tubes and cervix removed along with uterus), appendectomy, frequent ovarian cysts, presents to the ED for left lower quadrant/pelvic pain today.  Patient states pain is extremely sharp and severe at this point started off as a burning/pressure kind of pain.  Associated with nausea/vomiting X several times over the past several days.  Went to go see her GYN who did a intravaginal ultrasound and was told there was no ovarian pathology noted.  Does have some vaginal discharge that is not purulent, or itchy.  Her GYN noted this, no specific treatment started.  Patient sent to ER as she was having sharp pain in the GYN office.  Patient denies any diarrhea, dysuria, palpitations, shortness of breath, chest pain, cough, headache, leg swelling.  Does have some radiation pain now to the left flank area.  Denies any hematuria, urine frequency. Pt had labs, urine--all ok. CT a/p with contrast--no acute pathology, US pelvis--small left hemorrhagic cyst. Possible ruptured cyst earlier causing this pain. No other acute pathology noted, therefore to [place] home. Return precautions given.

## 2022-05-09 NOTE — HISTORY OF PRESENT ILLNESS
[Localized] : localized [FreeTextEntry1] : pt c/o of abdominal pain in LLQ since april 17th, associated with nausea and vomiting, temp 101F\par denies diarrhea / constipation\par pt also claims she saw vaginal bleeding after vomiting episode on 17th, only one episode\par pain has not improved but not getting worse\par pt had total hyst in 2019, ovaries in, has hx of ruptured ovarian cysts \par  [TextBox_102] : total hyst, ovaries in [LMPDate] : 2019 [TextBox_7] : LLQ [TextBox_10] : c

## 2022-05-09 NOTE — PHYSICAL EXAM
[Chaperone Present] : A chaperone was present in the examining room during all aspects of the physical examination [Appropriately responsive] : appropriately responsive [Alert] : alert [No Acute Distress] : no acute distress [Soft] : soft [Non-distended] : non-distended [Oriented x3] : oriented x3 [Labia Majora] : normal [Labia Minora] : normal [Normal] : normal [Discharge] : a  ~M vaginal discharge was present [White] : white [Curds] : curd-like [No Bleeding] : There was no active vaginal bleeding [Absent] : absent [Uterine Adnexae] : normal  [Adnexa Tenderness On The Left] : tender [FreeTextEntry7] : tender , no rebound, minimal guarding [FreeTextEntry4] : cuff intact

## 2022-05-09 NOTE — DISCUSSION/SUMMARY
[FreeTextEntry1] : CT scan abd/pelvis  r/o GI etiology/ diverticulitis \par no evidence of any vaginal bleeding\par

## 2022-05-10 VITALS
RESPIRATION RATE: 20 BRPM | HEART RATE: 59 BPM | DIASTOLIC BLOOD PRESSURE: 58 MMHG | TEMPERATURE: 97 F | SYSTOLIC BLOOD PRESSURE: 103 MMHG | OXYGEN SATURATION: 98 %

## 2022-05-10 LAB
CULTURE RESULTS: SIGNIFICANT CHANGE UP
SPECIMEN SOURCE: SIGNIFICANT CHANGE UP

## 2022-05-10 PROCEDURE — 76830 TRANSVAGINAL US NON-OB: CPT | Mod: 26

## 2022-05-10 RX ORDER — ONDANSETRON 8 MG/1
1 TABLET, FILM COATED ORAL
Qty: 15 | Refills: 0
Start: 2022-05-10 | End: 2022-05-14

## 2022-05-11 ENCOUNTER — NON-APPOINTMENT (OUTPATIENT)
Age: 42
End: 2022-05-11

## 2022-05-11 ENCOUNTER — APPOINTMENT (OUTPATIENT)
Dept: OBGYN | Facility: CLINIC | Age: 42
End: 2022-05-11
Payer: COMMERCIAL

## 2022-05-11 VITALS
BODY MASS INDEX: 26.37 KG/M2 | TEMPERATURE: 99 F | DIASTOLIC BLOOD PRESSURE: 78 MMHG | HEART RATE: 71 BPM | HEIGHT: 67 IN | WEIGHT: 168 LBS | SYSTOLIC BLOOD PRESSURE: 117 MMHG

## 2022-05-11 PROCEDURE — 99396 PREV VISIT EST AGE 40-64: CPT | Mod: 25

## 2022-05-11 PROCEDURE — 76830 TRANSVAGINAL US NON-OB: CPT

## 2022-05-11 NOTE — HISTORY OF PRESENT ILLNESS
[FreeTextEntry1] : Here for annual...was in the ER to r/o diverticulitis a few days ago....which was ruled out by labs and ct scan.  She had bilateral small ovarian cysts and was told that she probably had endometriosis.\par She had a total hysterectomy , Bilateral salpingectomy prior , and a small focus of endometriosis was found on the serosa on pathological analysis.  \par Her pain is severe, bilateral , associated with nausea and vomiting...and she is tasking zofran to control her nausea.\par \par LAST ester visit:\par \par \par    	Print\par Patient\par Name	SUSU ARREAGA (39yo, F) ID# [id number]	Appt. Date/Time	10/02/2020 03:00PM\par 	1980	Service Dept.	[facility] OFFICE\par Provider	MAR ENGLAND MD\par Insurance	\par Med Primary: [employer] (POS II)\par Insurance # : [id number]\par Policy/Group # : [id number]\par Prescription: [facility] - Member is eligible. details\par Prescription: [facility] - Member is eligible. details\par Chief Complaint\par annual gyn exam, F/U to ER visit\par Patient's Care Team\par Primary Care Provider: DAGOBERTO FLORES MD: [address] [place] [address], Ph [phone], Fax [phone] NPI: [id number]\par Patient's Pharmacies\par [id number] [address] [place] [address], Ph [phone], Fax [phone]\par [facility] (MAIL-ORDER, [place]): [address] [facility], [place] [address], Ph [phone], Fax [phone]\par [id number] ([place]): [address] [place] [address], Ph [phone], Fax [phone]\par Vitals\par 10/02/2020 03:05 pm\par Ht:	5 ft 6 in\par Wt:	165 lbs\par BMI:	26.6\par BP:	120/74 sitting\par T:	97.7 F°\par Allergies\par Reviewed Allergies\par NKDA\par Medications\par Reviewed Medications\par albuterol sulfate HFA 90 mcg/actuation aerosol inhaler\par 20   filled	surescripts\par amoxicillin 500 mg capsule\par 07/23/15   filled	[facility]\par amoxicillin 500 mg tablet\par 18   filled	[facility]\par amoxicillin 875 mg tablet\par 11/14/15   filled	[facility]\par azithromycin 250 mg tablet\par TAKE 2 TABLETS BY MOUTH TODAY, THEN TAKE 1 TABLET DAILY FOR 4 DAYS\par 20   filled	[facility]cript[facility]\par benzonatate 100 mg capsule\par 18   filled	[facility]\par Blisovi Fe  (28) 1 mg-20 mcg (21)/75 mg (7) tablet\par TAKE 1 TABLET DAILY AS DIRECTED\par 16   filled	[facility]\par Breo Ellipta 200 mcg-25 mcg/dose powder for inhalation\par 20   filled	surescripts\par brompheniramine-pseudoephedrine-DM 2 mg-30 mg-10 mg/5 mL oral syrup\par 18   filled	[facility]\par budesonide 32 mcg/actuation nasal spray\par 11/30/15   filled	[facility]\par cefdinir 300 mg capsule\par 17   filled	[facility]\par cefPODOXime 200 mg tablet\par 18   filled	[facility]\par cephALEXin 250 mg capsule\par 17   filled	[facility]\par ciprofloxacin 500 mg tablet\par Take 1 tablet(s) every 12 hours by oral route for 7 days.\par 09/10/19   filled	[facility]\par esomeprazole magnesium 20 mg capsule,delayed release\par 11/10/16   filled	[facility]\par ibuprofen 800 mg tablet\par 19   filled	[facility]\par ipratropium bromide 0.03 % nasal spray\par 18   filled	[facility]\par Lidocaine Viscous 2 % mucosal solution\par 11/10/16   filled	[facility]\par Lo Loestrin Fe 1 mg-10 mcg (24)/10 mcg (2) tablet\par Take 1 tablet(s) every day by oral route as directed for 28 days.\par 10/27/15   filled	[facility]\par methylPREDNISolone 4 mg tablets in a dose pack\par 11/18/15   filled	[facility]\par metroNIDAZOLE 500 mg tablet\par Take 1 tablet(s) twice a day by oral route for 7 days.\par 10/16/19   filled	[facility]\par Mi-Acid Gas Relief (simethicone) 80 mg chewable tablet\par 19   filled	[facility]\par naproxen 500 mg tablet\par 17   filled	[facility]\par nitrofurantoin monohydrate/macrocrystals 100 mg capsule\par 17   filled	[facility]\par norelgestromin 150 mcg-e.estradioL 35 mcg/24 hr weekly transderm patch\par Apply 1 patch(es) every week by transdermal route.\par 10/02/20   prescribed	Mar England MD\par norethindrone acetate 5 mg tablet\par Take 1 tablet(s) every day by oral route as directed for 30 days.\par 16   filled	[facility]\par Onexton 1.2 % (1 % base)-3.75 % topical gel\par 16   filled	[facility]\par phenazopyridine 200 mg tablet\par 17   filled	[facility]\par promethazine-DM 6.25 mg-15 mg/5 mL oral syrup\par 11/17/15   filled	[facility]\par Stool Softener 100 mg capsule\par 19   filled	[facility]\par Tamiflu 75 mg capsule\par 16   filled	[facility]\par Problems\par Reviewed Problems\par Ruptured cyst of ovary\par Dysmenorrhea\par Menorrhagia\par Gynecologic examination\par Family History\par Reviewed Family History\par Maternal Grandmother	- Malignant tumor of breast\par Social History\par Reviewed Social History\par Routine Gyn\par Tobacco Smoking Status: Former smoker (Notes: quit )\par Non-smoker (Notes: quit)\par Most Recent Tobacco Use Screening: 10/02/2020\par Occupation:  (Notes: hospital)\par Diet: Regular\par Marital status: \par Sexual orientation: Heterosexual\par Surgical History\par Reviewed Surgical History\par Robot assisted laparoscopic total hysterectomy\par Dilation and Curettage\par Endometrial Ablation\par Tubal Ligation\par GYN History\par Reviewed GYN History\par Duration of Flow (days): 4.\par LMP: Definite (Notes: ablation (2016)).\par Frequency of Cycle (Q days): 28.\par Menses Monthly: Y.\par Flow: Light (Notes: very heavy off meds).\par Current Birth Control Method: BCPs.\par Date of LMP: 2019 (Notes: hyst).\par Obstetric History\par Reviewed Obstetric History\par TOTAL	FULL	PRE	AB. I	AB. S	ECTOPICS	MULTIPLE	LIVING\par 2							2\par 2 nvd\par \par Past Medical History\par Reviewed Past Medical History\par Depression: Y - off meds now\par GI Problems: Y - hx of eating disorder > 12yrs ago\par Notes: pp depression after 1st child\par Screening\par None recorded.\par Physical Exam\par Patient is a 40-year-old female.\par \par Chaperone: Chaperone: present.\par \par Female Genitalia: Vulva: no masses, atrophy, or lesions. Bladder/Urethra: no urethral discharge or mass and normal meatus and bladder non distended. Vagina no tenderness, erythema, cystocele, rectocele, abnormal vaginal discharge, or vesicle(s) or ulcers. Cervix: absent. Uterus: absent. Adnexa/Parametria: no parametrial tenderness or mass and no adnexal tenderness or ovarian mass.\par \par Breast: Inspection/Palpation: no erythema, induration, tenderness, skin changes, abnormal secretions, or distinct masses and normal nipple appearance and non tender axillary lymph nodes.\par \par Abdomen: Auscultation/Inspection/Palpation: no tenderness, hepatomegaly, splenomegaly, masses, or CVA tenderness and soft, non-distended, and normal bowel sounds. Hernia: none palpated.\par Assessment / Plan\par 1. Ruptured cyst of ovary -\par RECURRENT\par \par COPIOUS FREE FLUID\par \par WANT TO START HORMONAL SUPPRESSION WITHOUT WEIGHT GAIN\par \par LOW DOSE PATCH TO SUPP RESS CYST GROWTH\par \par N83.209: Unspecified ovarian cyst, unspecified side\par norelgestromin 150 mcg-e.estradiol 35 mcg/24 hr weekly transderm patch - Apply 1 patch(es) every week by transdermal route.     Qty: 9 patch(es)     Refills: 1     Pharmacy: [id number]\par US, TRANSVAGINAL\par \par 2. Gynecologic examination -\par MAMMO/SONO\par \par Z01.419: Encounter for gynecological examination (general) (routine) without abnormal findings\par PAP, LB\par \par US, TRANSVAGINAL\par \par Review of us, transvaginal taken on 10/02/2020 at [facility] OFFICE shows:\par Imaging Studies:\par Indications: pelvic pain and ovarian cyst.\par Uterus: absent.\par Cervix: ABSENT.\par Cul de sac: fluid present (COPIOUS).\par Right Ovary: volume (cc): 3.3.\par Left Ovary: volume (cc): 7.3.\par \par Return to Office\par None recorded.\par Encounter Sign-Off\par Encounter signed-off by Mar England MD, 10/02/2020.\par Encounter performed and documented by Mar England MD\par Encounter reviewed & signed by Mar England MD on 10/02/2020 at 3:45pm\par Audit history\par

## 2022-05-11 NOTE — PHYSICAL EXAM
[Chaperone Present] : A chaperone was present in the examining room during all aspects of the physical examination [Labia Majora] : normal [Labia Minora] : normal [Normal] : normal [Tenderness] : tenderness [Absent] : absent [Uterine Adnexae] : normal [FreeTextEntry7] : diffuse tenderness, bloated [FreeTextEntry4] : cuff very tender

## 2022-05-11 NOTE — PROCEDURE
[Transvaginal Ultrasound] : transvaginal ultrasound [Absent] : absent [FreeTextEntry3] : cervix absent \par vagina very tender to probe contact\par small amount of free fluid in the cul de sac [FreeTextEntry7] : 1.6cc [FreeTextEntry8] : 4,5cc

## 2022-05-11 NOTE — REVIEW OF SYSTEMS
[Abdominal Pain] : abdominal pain [Vomiting] : vomiting [Nausea] : nausea [Pelvic pain] : pelvic pain [Negative] : Breast

## 2022-05-13 LAB
A VAGINAE DNA VAG QL NAA+PROBE: NORMAL
BVAB2 DNA VAG QL NAA+PROBE: NORMAL
C KRUSEI DNA VAG QL NAA+PROBE: NEGATIVE
C TRACH RRNA SPEC QL NAA+PROBE: NEGATIVE
HPV HIGH+LOW RISK DNA PNL CVX: NOT DETECTED
MEGA1 DNA VAG QL NAA+PROBE: NORMAL
N GONORRHOEA RRNA SPEC QL NAA+PROBE: NEGATIVE
T VAGINALIS RRNA SPEC QL NAA+PROBE: NEGATIVE

## 2022-05-16 ENCOUNTER — NON-APPOINTMENT (OUTPATIENT)
Age: 42
End: 2022-05-16

## 2022-05-17 ENCOUNTER — NON-APPOINTMENT (OUTPATIENT)
Age: 42
End: 2022-05-17

## 2022-05-21 LAB — CYTOLOGY CVX/VAG DOC THIN PREP: NORMAL

## 2022-06-14 ENCOUNTER — NON-APPOINTMENT (OUTPATIENT)
Age: 42
End: 2022-06-14

## 2022-08-03 ENCOUNTER — APPOINTMENT (OUTPATIENT)
Dept: OBGYN | Facility: CLINIC | Age: 42
End: 2022-08-03

## 2022-08-03 VITALS
HEART RATE: 80 BPM | BODY MASS INDEX: 26.21 KG/M2 | SYSTOLIC BLOOD PRESSURE: 109 MMHG | WEIGHT: 167 LBS | TEMPERATURE: 98.8 F | HEIGHT: 67 IN | DIASTOLIC BLOOD PRESSURE: 74 MMHG

## 2022-08-03 DIAGNOSIS — R92.2 INCONCLUSIVE MAMMOGRAM: ICD-10-CM

## 2022-08-03 PROCEDURE — 99213 OFFICE O/P EST LOW 20 MIN: CPT

## 2022-08-03 NOTE — HISTORY OF PRESENT ILLNESS
[FreeTextEntry1] : MRI discussion regarding her severe dyspareunia and dyschezia.\par The MRI only has subtle changes consistent with fibrosis which could be related to post surgical change vs. deep infiltrating endometriosis.\par Discussed robotic excision and the possible need for colorectal intervention.  Will have her consult with Dr. Amaya and ask him to be available the day of her procedure for intraoperative consultation and surgical excision.\par \par Discussed in detail the procedure and the potential complications including but not limited to hemorrhage, infection, and fever.  The risks of potential urinary, gastroenterological and vascular direct complications discussed as well, including but not limited to, ureteral repair, bladder repair, bowel repair, vascular repair and possible colostomy discussed as well.  She understands and is still willing to undergo the procedure.  All questioned answered to her satisfaction.\par

## 2022-08-08 ENCOUNTER — NON-APPOINTMENT (OUTPATIENT)
Age: 42
End: 2022-08-08

## 2022-08-09 ENCOUNTER — NON-APPOINTMENT (OUTPATIENT)
Age: 42
End: 2022-08-09

## 2022-08-09 ENCOUNTER — APPOINTMENT (OUTPATIENT)
Dept: SURGERY | Facility: CLINIC | Age: 42
End: 2022-08-09

## 2022-08-09 VITALS
SYSTOLIC BLOOD PRESSURE: 99 MMHG | HEIGHT: 67 IN | TEMPERATURE: 97.3 F | DIASTOLIC BLOOD PRESSURE: 74 MMHG | OXYGEN SATURATION: 98 % | BODY MASS INDEX: 26.37 KG/M2 | WEIGHT: 168 LBS | HEART RATE: 98 BPM

## 2022-08-09 PROCEDURE — 99204 OFFICE O/P NEW MOD 45 MIN: CPT

## 2022-08-11 NOTE — CONSULT LETTER
[Dear  ___] : Dear  [unfilled], [Consult Letter:] : I had the pleasure of evaluating your patient, [unfilled]. [Please see my note below.] : Please see my note below. [Consult Closing:] : Thank you very much for allowing me to participate in the care of this patient.  If you have any questions, please do not hesitate to contact me. [FreeTextEntry3] : Sincerely,\par \par Adan Amaya MD, Colon and Rectal Surgery\par Director of Colon and Rectal Surgery\par \par [employer] [facility]\par [address] [place] [address]\par [facility], 3rd Floor\par [place], [place] [address]\par Tel [phone] ext [phone]\par Fax [phone]\par

## 2022-08-11 NOTE — HISTORY OF PRESENT ILLNESS
[FreeTextEntry1] : Patient is a 41F with PMH of endometriosis who presents for evaluation.  Patient has previously undergone a hysterectomy and bilateral salpingectomy in 2019 that showed a small focus of endometriosis.  She reports lower abdominal pain associated with nausea and vomiting. She also reports dyspareunia and pain with BM.  She was evaluated by Dr. Michelle with MRI that showed subtle changes that may represent fibrosis vs deep infiltrating endometriosis. Patient denies fevers, chills, diarrhea, blood in the stool or unexpected weight loss.  Patient denies a family history of colon cancer rectal cancer or inflammatory bowel disease.  Patient's last colonoscopy was 5 years ago with Dr. Javier.gen

## 2022-08-11 NOTE — PHYSICAL EXAM
[No HSM] : no hepatosplenomegaly [Respiratory Effort] : normal respiratory effort [Normal Rate and Rhythm] : normal rate and rhythm [Abdomen Masses] : No abdominal masses [Abdomen Tenderness] : ~T No ~M abdominal tenderness [de-identified] : awake, alert and in no acute distress

## 2022-08-11 NOTE — ASSESSMENT
[FreeTextEntry1] : 41F with endometriosis\par \par I discussed her pathology.  We discussed a combined procedure with Dr. Michelle including lysis of adhesions, resection of endometriosis, possible low anterior resection, possible ostomy.  All risks benefits and alternatives were discussed including bleeding, infection, damage to surrounding structures including the ureters, anastomotic leak, anastomotic stricture, cardiopulmonary events, thromboembolic events and hernia. We discussed possible admission if she does require a resection. Patient expressed understanding and was in agreement with the plan.\par

## 2022-08-31 ENCOUNTER — NON-APPOINTMENT (OUTPATIENT)
Age: 42
End: 2022-08-31

## 2022-09-30 ENCOUNTER — NON-APPOINTMENT (OUTPATIENT)
Age: 42
End: 2022-09-30

## 2022-10-07 ENCOUNTER — OUTPATIENT (OUTPATIENT)
Dept: OUTPATIENT SERVICES | Facility: HOSPITAL | Age: 42
LOS: 1 days | Discharge: HOME | End: 2022-10-07

## 2022-10-07 VITALS
SYSTOLIC BLOOD PRESSURE: 110 MMHG | WEIGHT: 167.99 LBS | HEART RATE: 74 BPM | OXYGEN SATURATION: 98 % | RESPIRATION RATE: 16 BRPM | TEMPERATURE: 98 F | HEIGHT: 66 IN | DIASTOLIC BLOOD PRESSURE: 60 MMHG

## 2022-10-07 DIAGNOSIS — Z01.818 ENCOUNTER FOR OTHER PREPROCEDURAL EXAMINATION: ICD-10-CM

## 2022-10-07 DIAGNOSIS — N80.9 ENDOMETRIOSIS, UNSPECIFIED: ICD-10-CM

## 2022-10-07 DIAGNOSIS — Z90.710 ACQUIRED ABSENCE OF BOTH CERVIX AND UTERUS: Chronic | ICD-10-CM

## 2022-10-07 LAB
ALBUMIN SERPL ELPH-MCNC: 4.5 G/DL — SIGNIFICANT CHANGE UP (ref 3.5–5.2)
ALP SERPL-CCNC: 62 U/L — SIGNIFICANT CHANGE UP (ref 30–115)
ALT FLD-CCNC: 8 U/L — SIGNIFICANT CHANGE UP (ref 0–41)
ANION GAP SERPL CALC-SCNC: 12 MMOL/L — SIGNIFICANT CHANGE UP (ref 7–14)
APTT BLD: 32.4 SEC — SIGNIFICANT CHANGE UP (ref 27–39.2)
AST SERPL-CCNC: 13 U/L — SIGNIFICANT CHANGE UP (ref 0–41)
BASOPHILS # BLD AUTO: 0.03 K/UL — SIGNIFICANT CHANGE UP (ref 0–0.2)
BASOPHILS NFR BLD AUTO: 0.6 % — SIGNIFICANT CHANGE UP (ref 0–1)
BILIRUB SERPL-MCNC: 0.6 MG/DL — SIGNIFICANT CHANGE UP (ref 0.2–1.2)
BUN SERPL-MCNC: 11 MG/DL — SIGNIFICANT CHANGE UP (ref 10–20)
CALCIUM SERPL-MCNC: 9.5 MG/DL — SIGNIFICANT CHANGE UP (ref 8.4–10.5)
CHLORIDE SERPL-SCNC: 101 MMOL/L — SIGNIFICANT CHANGE UP (ref 98–110)
CO2 SERPL-SCNC: 25 MMOL/L — SIGNIFICANT CHANGE UP (ref 17–32)
CREAT SERPL-MCNC: 0.7 MG/DL — SIGNIFICANT CHANGE UP (ref 0.7–1.5)
EGFR: 111 ML/MIN/1.73M2 — SIGNIFICANT CHANGE UP
EOSINOPHIL # BLD AUTO: 0.12 K/UL — SIGNIFICANT CHANGE UP (ref 0–0.7)
EOSINOPHIL NFR BLD AUTO: 2.4 % — SIGNIFICANT CHANGE UP (ref 0–8)
GLUCOSE SERPL-MCNC: 71 MG/DL — SIGNIFICANT CHANGE UP (ref 70–99)
HCT VFR BLD CALC: 41.3 % — SIGNIFICANT CHANGE UP (ref 37–47)
HGB BLD-MCNC: 14.2 G/DL — SIGNIFICANT CHANGE UP (ref 12–16)
IMM GRANULOCYTES NFR BLD AUTO: 0.4 % — HIGH (ref 0.1–0.3)
INR BLD: 0.97 RATIO — SIGNIFICANT CHANGE UP (ref 0.65–1.3)
LYMPHOCYTES # BLD AUTO: 1.52 K/UL — SIGNIFICANT CHANGE UP (ref 1.2–3.4)
LYMPHOCYTES # BLD AUTO: 29.9 % — SIGNIFICANT CHANGE UP (ref 20.5–51.1)
MCHC RBC-ENTMCNC: 29.6 PG — SIGNIFICANT CHANGE UP (ref 27–31)
MCHC RBC-ENTMCNC: 34.4 G/DL — SIGNIFICANT CHANGE UP (ref 32–37)
MCV RBC AUTO: 86 FL — SIGNIFICANT CHANGE UP (ref 81–99)
MONOCYTES # BLD AUTO: 0.41 K/UL — SIGNIFICANT CHANGE UP (ref 0.1–0.6)
MONOCYTES NFR BLD AUTO: 8.1 % — SIGNIFICANT CHANGE UP (ref 1.7–9.3)
NEUTROPHILS # BLD AUTO: 2.99 K/UL — SIGNIFICANT CHANGE UP (ref 1.4–6.5)
NEUTROPHILS NFR BLD AUTO: 58.6 % — SIGNIFICANT CHANGE UP (ref 42.2–75.2)
NRBC # BLD: 0 /100 WBCS — SIGNIFICANT CHANGE UP (ref 0–0)
PLATELET # BLD AUTO: 286 K/UL — SIGNIFICANT CHANGE UP (ref 130–400)
POTASSIUM SERPL-MCNC: 4.9 MMOL/L — SIGNIFICANT CHANGE UP (ref 3.5–5)
POTASSIUM SERPL-SCNC: 4.9 MMOL/L — SIGNIFICANT CHANGE UP (ref 3.5–5)
PROT SERPL-MCNC: 7 G/DL — SIGNIFICANT CHANGE UP (ref 6–8)
PROTHROM AB SERPL-ACNC: 11.1 SEC — SIGNIFICANT CHANGE UP (ref 9.95–12.87)
RBC # BLD: 4.8 M/UL — SIGNIFICANT CHANGE UP (ref 4.2–5.4)
RBC # FLD: 12.4 % — SIGNIFICANT CHANGE UP (ref 11.5–14.5)
SODIUM SERPL-SCNC: 138 MMOL/L — SIGNIFICANT CHANGE UP (ref 135–146)
WBC # BLD: 5.09 K/UL — SIGNIFICANT CHANGE UP (ref 4.8–10.8)
WBC # FLD AUTO: 5.09 K/UL — SIGNIFICANT CHANGE UP (ref 4.8–10.8)

## 2022-10-07 RX ORDER — CEPHALEXIN 500 MG
0 CAPSULE ORAL
Qty: 0 | Refills: 0 | DISCHARGE

## 2022-10-07 RX ORDER — PHENAZOPYRIDINE HCL 100 MG
1 TABLET ORAL
Qty: 0 | Refills: 0 | DISCHARGE

## 2022-10-07 NOTE — H&P PST ADULT - NS PRO ABUSE SCREEN AFRAID ANYONE YN
Take all your antibiotics until gone. NO sex, no Tampon use until cleared by OB. Return for heavy vaginal bleeding greater than a pad an hour, fever, lightheadedness or pass out.   
no

## 2022-10-07 NOTE — H&P PST ADULT - BLOOD TRANSFUSION, PREVIOUS, PROFILE
CARDIAC NUCLEAR PERFUSION STUDY



PRIMARY CARE PHYSICIAN:  ____



REASON FOR STUDY:  Chest pain.



IMAGING PROTOCOL:  The patient received 10 mCi of Technetium 99m Tetrofosmin for

resting image and 28 mCi of Technetium 99m Tetrofosmin for stress imaging.  The

imaging for the whole procedure was completed 30-90 minutes following the

initial injection of Technetium 99m Tetrofosmin. The SPECT imaging in the 180

degree arc was performed in the right anterior oblique projection.  Computerized

reconstruction of the images was performed for analysis.



IMAGING RESULTS:

1.  Normal cavity size from stress to rest.

2.  Normal distribution of radionuclide in the anterior, inferior, septal, and

apical regions.  Gated SPECT stress 81% ____ the patient infused Lexiscan with

no EKG changes.



SUMMARY:

1.  Negative Lexiscan EKG.

2.  Normal rest and stress myocardial perfusion scan.  No significant ischemia. 

No wall motion abnormality.  Gated SPECT, greater than 65%.





DD: 02/23/2021 10:25

DT: 02/23/2021 11:15

JOB# [id number]  [id number]

NORBERT/CORNELL
no

## 2022-10-07 NOTE — H&P PST ADULT - HISTORY OF PRESENT ILLNESS
42yr old female  EMT -States pelvic and vulval pain , at times radiating to LT back has progressively worsened since 4/2022- s/p Hysterectomy  2019? for endometriosis -is now scheduled for PELVIC EXAMINATION UNDER ANESTHESIA, ROBOTIC EXCISION OF ENDOMETRIOSIS, BILATERAL URETEROLYSIS, POSSIBLE OPEN. Dr. Amaya on  standby for this surgery if endometriosis involves GI. pt has appt with Dr. Amaya  to review  prep and other instructions.  Denies COVID S/S. Recd 1 dose J &J. Verbalized understanding of COVID prevention measures. Exercise reji 3-4 FOS.  Anesthesia Alert  NO--Difficult Airway  NO--History of neck surgery or radiation  NO--Limited ROM of neck  NO--History of Malignant hyperthermia  NO--Personal or family history of Pseudocholinesterase deficiency  YES Bradycardia--Prior Anesthesia Complication  NO--Latex Allergy  NO--Loose teeth  NO--History of Rheumatoid Arthritis  NO--JOSIAH  No Bleeding risk  NO--Other_____

## 2022-10-07 NOTE — H&P PST ADULT - NSICDXPASTSURGICALHX_GEN_ALL_CORE_FT
PAST SURGICAL HISTORY:  H/O: hysterectomy 2019  ENDOMETRIAL ABLATION   tubal ligation  appendectomy at age 18

## 2022-10-13 ENCOUNTER — NON-APPOINTMENT (OUTPATIENT)
Age: 42
End: 2022-10-13

## 2022-10-18 ENCOUNTER — APPOINTMENT (OUTPATIENT)
Dept: ORTHOPEDIC SURGERY | Facility: CLINIC | Age: 42
End: 2022-10-18

## 2022-10-18 DIAGNOSIS — S63.641S SPRAIN OF METACARPOPHALANGEAL JOINT OF RIGHT THUMB, SEQUELA: ICD-10-CM

## 2022-10-18 PROCEDURE — 99455 WORK RELATED DISABILITY EXAM: CPT

## 2022-10-18 PROCEDURE — 99072 ADDL SUPL MATRL&STAF TM PHE: CPT

## 2022-10-18 NOTE — WORK
[Has the patient reached Maximum Medical Improvement? If yes, indicate date___] : Yes, on [unfilled] [Right] : right [FreeTextEntry7] : thumb [FreeTextEntry5] : 20

## 2022-10-18 NOTE — ASSESSMENT
[FreeTextEntry1] : The patient comes in for schedule loss of use.  She is here status post injuring her right thumb MP joint.  She is doing relatively well.  She says there are times that she has some pain and she has to take some anti-inflammatories and rest the hand.  After she uses it a lot.  She does not have other complaints today.\par \par   Right thumb:  IP joint flexion to 90°, extension to 0, MP joint 0-80, radial abduction 75°, flexion/ adduction/opposition to base between 4 and 5, extension 5°\par \par dynanometer:  60 lb of force/55/42\par \par  left thumb:  IP joint 0-90, MP joint 0-85, radial abduction 90°, flexion / adduction/opposition to base of 5th, extension 15°\par \par dynanometer:  80 lb of force x3\par \par  patient has reached maximal medical improvement.  She will follow up with me as needed.

## 2022-10-20 RX ORDER — METRONIDAZOLE 500 MG/1
500 TABLET ORAL
Qty: 6 | Refills: 0 | Status: ACTIVE | COMMUNITY
Start: 2022-10-20 | End: 1900-01-01

## 2022-10-20 RX ORDER — NEOMYCIN SULFATE 500 MG/1
500 TABLET ORAL
Qty: 6 | Refills: 0 | Status: ACTIVE | COMMUNITY
Start: 2022-10-20 | End: 1900-01-01

## 2022-10-25 ENCOUNTER — LABORATORY RESULT (OUTPATIENT)
Age: 42
End: 2022-10-25

## 2022-10-27 NOTE — ASU PATIENT PROFILE, ADULT - FALL HARM RISK - UNIVERSAL INTERVENTIONS
Bed in lowest position, wheels locked, appropriate side rails in place/Call bell, personal items and telephone in reach/Instruct patient to call for assistance before getting out of bed or chair/Non-slip footwear when patient is out of bed/[place] to call system/Physically safe environment - no spills, clutter or unnecessary equipment/Purposeful Proactive Rounding/Room/bathroom lighting operational, light cord in reach

## 2022-10-27 NOTE — ASU PATIENT PROFILE, ADULT - NS PREOP MEDICATION GIVEN
Patient directed to follow instructions provided by MD/in pre-surgical testing regarding medications.

## 2022-10-28 ENCOUNTER — OUTPATIENT (OUTPATIENT)
Dept: OUTPATIENT SERVICES | Facility: HOSPITAL | Age: 42
LOS: 1 days | Discharge: HOME | End: 2022-10-28

## 2022-10-28 ENCOUNTER — RESULT REVIEW (OUTPATIENT)
Age: 42
End: 2022-10-28

## 2022-10-28 ENCOUNTER — TRANSCRIPTION ENCOUNTER (OUTPATIENT)
Age: 42
End: 2022-10-28

## 2022-10-28 ENCOUNTER — APPOINTMENT (OUTPATIENT)
Dept: OBGYN | Facility: HOSPITAL | Age: 42
End: 2022-10-28

## 2022-10-28 VITALS
WEIGHT: 164.91 LBS | DIASTOLIC BLOOD PRESSURE: 62 MMHG | SYSTOLIC BLOOD PRESSURE: 99 MMHG | RESPIRATION RATE: 16 BRPM | HEART RATE: 93 BPM | OXYGEN SATURATION: 96 % | TEMPERATURE: 98 F | HEIGHT: 66 IN

## 2022-10-28 VITALS
SYSTOLIC BLOOD PRESSURE: 100 MMHG | TEMPERATURE: 98 F | DIASTOLIC BLOOD PRESSURE: 63 MMHG | RESPIRATION RATE: 19 BRPM | HEART RATE: 64 BPM | OXYGEN SATURATION: 100 %

## 2022-10-28 DIAGNOSIS — Z90.710 ACQUIRED ABSENCE OF BOTH CERVIX AND UTERUS: Chronic | ICD-10-CM

## 2022-10-28 LAB
ABO RH CONFIRMATION: SIGNIFICANT CHANGE UP
BLD GP AB SCN SERPL QL: SIGNIFICANT CHANGE UP
GLUCOSE BLDC GLUCOMTR-MCNC: 94 MG/DL — SIGNIFICANT CHANGE UP (ref 70–99)

## 2022-10-28 PROCEDURE — 88342 IMHCHEM/IMCYTCHM 1ST ANTB: CPT | Mod: 26

## 2022-10-28 PROCEDURE — 88341 IMHCHEM/IMCYTCHM EA ADD ANTB: CPT | Mod: 26

## 2022-10-28 PROCEDURE — 44180 LAP ENTEROLYSIS: CPT

## 2022-10-28 PROCEDURE — 58662 LAPAROSCOPY EXCISE LESIONS: CPT

## 2022-10-28 PROCEDURE — 88304 TISSUE EXAM BY PATHOLOGIST: CPT | Mod: 26

## 2022-10-28 PROCEDURE — S2900 ROBOTIC SURGICAL SYSTEM: CPT

## 2022-10-28 RX ORDER — KETOROLAC TROMETHAMINE 30 MG/ML
15 SYRINGE (ML) INJECTION ONCE
Refills: 0 | Status: DISCONTINUED | OUTPATIENT
Start: 2022-10-28 | End: 2022-10-28

## 2022-10-28 RX ORDER — ONDANSETRON 8 MG/1
4 TABLET, FILM COATED ORAL ONCE
Refills: 0 | Status: DISCONTINUED | OUTPATIENT
Start: 2022-10-28 | End: 2022-10-31

## 2022-10-28 RX ORDER — BUPIVACAINE 13.3 MG/ML
20 INJECTION, SUSPENSION, LIPOSOMAL INFILTRATION ONCE
Refills: 0 | Status: DISCONTINUED | OUTPATIENT
Start: 2022-10-28 | End: 2022-10-28

## 2022-10-28 RX ORDER — HYDROMORPHONE HYDROCHLORIDE 2 MG/ML
0.5 INJECTION INTRAMUSCULAR; INTRAVENOUS; SUBCUTANEOUS
Refills: 0 | Status: DISCONTINUED | OUTPATIENT
Start: 2022-10-28 | End: 2022-10-31

## 2022-10-28 RX ORDER — IBUPROFEN 200 MG
1 TABLET ORAL
Qty: 0 | Refills: 0 | DISCHARGE

## 2022-10-28 RX ADMIN — HYDROMORPHONE HYDROCHLORIDE 0.5 MILLIGRAM(S): 2 INJECTION INTRAMUSCULAR; INTRAVENOUS; SUBCUTANEOUS at 12:07

## 2022-10-28 RX ADMIN — Medication 15 MILLIGRAM(S): at 13:30

## 2022-10-28 NOTE — BRIEF OPERATIVE NOTE - OPERATION/FINDINGS
significant adhesions of the left ovary to the sigmoid colon and mesentery
Adhesions of bowel to both right and left pelvic side walls. Normal appearing ovaries. Endometriosis implants and fibrosis seen on pelvic side walls. Bilateral ureters seen with peristalsis,

## 2022-10-28 NOTE — ASU PREOP CHECKLIST - PATIENT'S PERSONAL PROPERTY REMOVED
pt cant removed R cartilage piercing/body piercing pt cant remove Right cartilage piercing; OR RNs made aware./body piercing

## 2022-10-28 NOTE — ASU DISCHARGE PLAN (ADULT/PEDIATRIC) - CARE PROVIDER_API CALL
Brendon Michelle)  Obstetrics and Gynecology  [facility] [place] [address]  Phone: [phone]  Fax: [phone]  Follow Up Time: 2 weeks

## 2022-10-28 NOTE — PRE-ANESTHESIA EVALUATION ADULT - NSANTHADDINFOFT_GEN_ALL_CORE
GA planned; Risks discussed including dental injury and more serious complications including cardiac and pulmonary complications and stroke.  Patient expresses understanding with regard to risks of anesthesia and wishes to proceed. GA planned; Risks discussed including dental injury and more serious complications including cardiac and pulmonary complications and stroke.  Patient expresses understanding with regard to risks of anesthesia and wishes to proceed.    late entry

## 2022-10-28 NOTE — BRIEF OPERATIVE NOTE - NSICDXBRIEFPROCEDURE_GEN_ALL_CORE_FT
PROCEDURES:  Robot-assisted laparoscopic excision of endometriosis 28-Oct-2022 11:30:13  Tiffanie Segura  Lysis of pelvic adhesions 28-Oct-2022 11:30:24  Tiffanie Segura  
PROCEDURES:  Lysis of pelvic adhesions 28-Oct-2022 11:30:24  Tiffanie Segura

## 2022-10-28 NOTE — CHART NOTE - NSCHARTNOTEFT_GEN_A_CORE
PACU ANESTHESIA ADMISSION NOTE      Procedure: Pelvic EUA, Robotic excision of endometriosis, Bilateral ureterolysis  Post op diagnosis:  endometriosis    __x__  Patent Airway    _x___  Full return of protective reflexes    ____  Full recovery from anesthesia / back to baseline     Vitals:  see anesthesia report      Mental Status:  ___x_ Awake   _____ Alert   _____ Drowsy   _____ Sedated    Nausea/Vomiting:  __x__ NO  ______Yes,   See Post - Op Orders          Pain Scale (0-10):  _____    Treatment: ____ None    ___x_ See Post - Op/PCA Orders    Post - Operative Fluids:   ____ Oral   __x__ See Post - Op Orders    Plan: Discharge:   __x__Home       _____Floor     _____Critical Care    _____  Other:_________________    Comments:  Uneventful intraoperative course. No anesthesia issues or complications noted. Patient stable upon arrival to PACU. Report given to RN. Discharge when criteria met.

## 2022-10-28 NOTE — ASU DISCHARGE PLAN (ADULT/PEDIATRIC) - NS MD DC FALL RISK RISK
For information on Fall & Injury Prevention, visit: [email or web address] OR  [email or web address] OR  https://www.cdc.gov/steadi/patient.html

## 2022-10-28 NOTE — ASU PREOP CHECKLIST - 1.
Pre-op portion of bundle completed & given to OR RNs June Boothe. Anesthesia notified type & screen and confirmation specimens needed; Dr. Smith to obtain intra-op; OR RNs made aware. Pre-op portion of bundle completed & given to OR RNs June & Shyann. Anesthesia notified type & screen and confirmation specimens needed; Dr. Smith to obtain intra-op; OR RNs made aware. OR RNs made aware Exparel ordered.

## 2022-10-28 NOTE — ASU PREOP CHECKLIST - ALLERGY BAND ON
no known allergies Cytokeratin Ae1/Ae3 - Negative Histology Text: CK staining demonstrates a normal density and pattern.

## 2022-10-28 NOTE — ASU PREOP CHECKLIST - BLOOD AVAILABLE
T&S and confirmation needed. Anesthesia made aware/n/a T&S and confirmation needed. Anesthesia made aware. Stated will collect in OR/n/a

## 2022-10-31 ENCOUNTER — NON-APPOINTMENT (OUTPATIENT)
Age: 42
End: 2022-10-31

## 2022-10-31 ENCOUNTER — APPOINTMENT (OUTPATIENT)
Dept: OBGYN | Facility: CLINIC | Age: 42
End: 2022-10-31

## 2022-10-31 VITALS
SYSTOLIC BLOOD PRESSURE: 112 MMHG | DIASTOLIC BLOOD PRESSURE: 74 MMHG | WEIGHT: 168 LBS | HEIGHT: 67 IN | HEART RATE: 78 BPM | BODY MASS INDEX: 26.37 KG/M2

## 2022-10-31 DIAGNOSIS — R33.8 OTHER POSTPROCEDURAL COMPLICATIONS AND DISORDERS OF GENITOURINARY SYSTEM: ICD-10-CM

## 2022-10-31 DIAGNOSIS — N99.89 OTHER POSTPROCEDURAL COMPLICATIONS AND DISORDERS OF GENITOURINARY SYSTEM: ICD-10-CM

## 2022-10-31 PROCEDURE — 99024 POSTOP FOLLOW-UP VISIT: CPT

## 2022-10-31 RX ORDER — PHENAZOPYRIDINE HYDROCHLORIDE 200 MG/1
200 TABLET ORAL 3 TIMES DAILY
Qty: 6 | Refills: 0 | Status: ACTIVE | COMMUNITY
Start: 2022-10-31 | End: 1900-01-01

## 2022-10-31 NOTE — HISTORY OF PRESENT ILLNESS
[FreeTextEntry1] : pt c/o urinary retention, has pressure and feels the need to urinate but urine "trickling " out, \par no vaginal bleeding, passing gas\par  pt had surgery on friday 10/28, having symptoms since she Friday night

## 2022-10-31 NOTE — DISCUSSION/SUMMARY
[FreeTextEntry1] : in office ultrasound of bladder, bladder full but w/ o overdistention\par offered to catheterize pt in office today, but pt refused, she is afraid of infection\par started pt on Pyridium x 2 days, has f/u appt on thursday \par pt was unable to submit a urine sample today\par

## 2022-10-31 NOTE — PHYSICAL EXAM
[Chaperone Present] : A chaperone was present in the examining room during all aspects of the physical examination [Appropriately responsive] : appropriately responsive [Alert] : alert [No Acute Distress] : no acute distress [Soft] : soft [Non-tender] : non-tender [Non-distended] : non-distended [Oriented x3] : oriented x3 [FreeTextEntry7] : incisions dry, intact without any evidence of infection

## 2022-11-01 LAB — SURGICAL PATHOLOGY STUDY: SIGNIFICANT CHANGE UP

## 2022-11-02 DIAGNOSIS — N80.353 ENDOMETRIOSIS OF BILATERAL PELVIC SIDEWALL, UNSPECIFIED DEPTH: ICD-10-CM

## 2022-11-02 DIAGNOSIS — Z90.710 ACQUIRED ABSENCE OF BOTH CERVIX AND UTERUS: ICD-10-CM

## 2022-11-02 DIAGNOSIS — N73.6 FEMALE PELVIC PERITONEAL ADHESIONS (POSTINFECTIVE): ICD-10-CM

## 2022-11-02 DIAGNOSIS — N80.3C1 ENDOMETRIOSIS OF THE RIGHT UTEROSACRAL LIGAMENT, UNSPECIFIED DEPTH: ICD-10-CM

## 2022-11-03 ENCOUNTER — APPOINTMENT (OUTPATIENT)
Dept: OBGYN | Facility: CLINIC | Age: 42
End: 2022-11-03

## 2022-11-03 VITALS
DIASTOLIC BLOOD PRESSURE: 78 MMHG | BODY MASS INDEX: 26.52 KG/M2 | HEIGHT: 66 IN | HEART RATE: 78 BPM | SYSTOLIC BLOOD PRESSURE: 122 MMHG | WEIGHT: 165 LBS

## 2022-11-03 PROCEDURE — 99024 POSTOP FOLLOW-UP VISIT: CPT

## 2022-11-03 NOTE — HISTORY OF PRESENT ILLNESS
[Pain is well-controlled] : pain is well-controlled [Pelvic Pressure] : pelvic pressure [Clean/Dry/Intact] : clean, dry and intact [None] : no vaginal bleeding [Normal] : normal [Fever] : no fever [Chills] : no chills [Nausea] : no nausea [Vomiting] : no vomiting [Erythema] : not erythematous [de-identified] : bladder has 120 cc in it...not overfilled

## 2022-11-08 ENCOUNTER — NON-APPOINTMENT (OUTPATIENT)
Age: 42
End: 2022-11-08

## 2022-11-09 ENCOUNTER — APPOINTMENT (OUTPATIENT)
Dept: OBGYN | Facility: CLINIC | Age: 42
End: 2022-11-09

## 2022-12-04 ENCOUNTER — FORM ENCOUNTER (OUTPATIENT)
Age: 42
End: 2022-12-04

## 2022-12-07 ENCOUNTER — APPOINTMENT (OUTPATIENT)
Dept: OBGYN | Facility: CLINIC | Age: 42
End: 2022-12-07

## 2022-12-07 VITALS
BODY MASS INDEX: 27.31 KG/M2 | WEIGHT: 174 LBS | SYSTOLIC BLOOD PRESSURE: 103 MMHG | DIASTOLIC BLOOD PRESSURE: 70 MMHG | HEART RATE: 80 BPM | HEIGHT: 67 IN

## 2022-12-07 DIAGNOSIS — N83.01 FOLLICULAR CYST OF RIGHT OVARY: ICD-10-CM

## 2022-12-07 DIAGNOSIS — Z98.890 OTHER SPECIFIED POSTPROCEDURAL STATES: ICD-10-CM

## 2022-12-07 PROCEDURE — 76830 TRANSVAGINAL US NON-OB: CPT

## 2022-12-07 PROCEDURE — 99024 POSTOP FOLLOW-UP VISIT: CPT

## 2022-12-07 RX ORDER — NORETHINDRONE ACETATE 5 MG/1
5 TABLET ORAL
Qty: 90 | Refills: 1 | Status: ACTIVE | COMMUNITY
Start: 2022-12-07 | End: 1900-01-01

## 2022-12-10 NOTE — HISTORY OF PRESENT ILLNESS
[Pain is well-controlled] : pain is well-controlled [Clean/Dry/Intact] : clean, dry and intact [None] : no vaginal bleeding [Normal] : normal [Pathology reviewed] : pathology reviewed [Fever] : no fever [Chills] : no chills [Nausea] : no nausea [Vomiting] : no vomiting [Erythema] : not erythematous [de-identified] : LAP EXCISION OF ENDOMETRIOSIS [de-identified] : RIGHT CRAMPING

## 2022-12-31 ENCOUNTER — EMERGENCY (EMERGENCY)
Facility: HOSPITAL | Age: 42
LOS: 0 days | Discharge: HOME | End: 2022-12-31
Attending: STUDENT IN AN ORGANIZED HEALTH CARE EDUCATION/TRAINING PROGRAM | Admitting: STUDENT IN AN ORGANIZED HEALTH CARE EDUCATION/TRAINING PROGRAM
Payer: COMMERCIAL

## 2022-12-31 VITALS
RESPIRATION RATE: 16 BRPM | HEART RATE: 80 BPM | WEIGHT: 175.05 LBS | DIASTOLIC BLOOD PRESSURE: 65 MMHG | TEMPERATURE: 97 F | SYSTOLIC BLOOD PRESSURE: 112 MMHG | OXYGEN SATURATION: 96 %

## 2022-12-31 VITALS
OXYGEN SATURATION: 99 % | SYSTOLIC BLOOD PRESSURE: 110 MMHG | RESPIRATION RATE: 18 BRPM | DIASTOLIC BLOOD PRESSURE: 60 MMHG | HEART RATE: 75 BPM
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
## 2022-12-31 DIAGNOSIS — R10.11 RIGHT UPPER QUADRANT PAIN: ICD-10-CM

## 2022-12-31 DIAGNOSIS — Z87.42 PERSONAL HISTORY OF OTHER DISEASES OF THE FEMALE GENITAL TRACT: ICD-10-CM
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
## 2022-12-31 DIAGNOSIS — Z90.710 ACQUIRED ABSENCE OF BOTH CERVIX AND UTERUS: ICD-10-CM

## 2022-12-31 DIAGNOSIS — R10.815 PERIUMBILIC ABDOMINAL TENDERNESS: ICD-10-CM

## 2022-12-31 DIAGNOSIS — Z90.710 ACQUIRED ABSENCE OF BOTH CERVIX AND UTERUS: Chronic | ICD-10-CM
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
## 2022-12-31 LAB
ALBUMIN SERPL ELPH-MCNC: 4.4 G/DL — SIGNIFICANT CHANGE UP (ref 3.5–5.2)
ALP SERPL-CCNC: 66 U/L — SIGNIFICANT CHANGE UP (ref 30–115)
ALT FLD-CCNC: 11 U/L — SIGNIFICANT CHANGE UP (ref 0–41)
ANION GAP SERPL CALC-SCNC: 12 MMOL/L — SIGNIFICANT CHANGE UP (ref 7–14)
AST SERPL-CCNC: 30 U/L — SIGNIFICANT CHANGE UP (ref 0–41)
BASOPHILS # BLD AUTO: 0.04 K/UL — SIGNIFICANT CHANGE UP (ref 0–0.2)
BASOPHILS NFR BLD AUTO: 0.7 % — SIGNIFICANT CHANGE UP (ref 0–1)
BILIRUB DIRECT SERPL-MCNC: <0.2 MG/DL — SIGNIFICANT CHANGE UP (ref 0–0.3)
BILIRUB INDIRECT FLD-MCNC: >0.3 MG/DL — SIGNIFICANT CHANGE UP (ref 0.2–1.2)
BILIRUB SERPL-MCNC: 0.5 MG/DL — SIGNIFICANT CHANGE UP (ref 0.2–1.2)
BUN SERPL-MCNC: 8 MG/DL — LOW (ref 10–20)
CALCIUM SERPL-MCNC: 9.8 MG/DL — SIGNIFICANT CHANGE UP (ref 8.4–10.5)
CHLORIDE SERPL-SCNC: 101 MMOL/L — SIGNIFICANT CHANGE UP (ref 98–110)
CO2 SERPL-SCNC: 24 MMOL/L — SIGNIFICANT CHANGE UP (ref 17–32)
CREAT SERPL-MCNC: 0.7 MG/DL — SIGNIFICANT CHANGE UP (ref 0.7–1.5)
EGFR: 111 ML/MIN/1.73M2 — SIGNIFICANT CHANGE UP
EOSINOPHIL # BLD AUTO: 0.05 K/UL — SIGNIFICANT CHANGE UP (ref 0–0.7)
EOSINOPHIL NFR BLD AUTO: 0.9 % — SIGNIFICANT CHANGE UP (ref 0–8)
GLUCOSE SERPL-MCNC: 79 MG/DL — SIGNIFICANT CHANGE UP (ref 70–99)
HCT VFR BLD CALC: 42.9 % — SIGNIFICANT CHANGE UP (ref 37–47)
HGB BLD-MCNC: 14.6 G/DL — SIGNIFICANT CHANGE UP (ref 12–16)
IMM GRANULOCYTES NFR BLD AUTO: 0.4 % — HIGH (ref 0.1–0.3)
LACTATE SERPL-SCNC: 2.8 MMOL/L — HIGH (ref 0.7–2)
LIDOCAIN IGE QN: 29 U/L — SIGNIFICANT CHANGE UP (ref 7–60)
LYMPHOCYTES # BLD AUTO: 1.22 K/UL — SIGNIFICANT CHANGE UP (ref 1.2–3.4)
LYMPHOCYTES # BLD AUTO: 22.8 % — SIGNIFICANT CHANGE UP (ref 20.5–51.1)
MCHC RBC-ENTMCNC: 29.6 PG — SIGNIFICANT CHANGE UP (ref 27–31)
MCHC RBC-ENTMCNC: 34 G/DL — SIGNIFICANT CHANGE UP (ref 32–37)
MCV RBC AUTO: 87 FL — SIGNIFICANT CHANGE UP (ref 81–99)
MONOCYTES # BLD AUTO: 0.35 K/UL — SIGNIFICANT CHANGE UP (ref 0.1–0.6)
MONOCYTES NFR BLD AUTO: 6.5 % — SIGNIFICANT CHANGE UP (ref 1.7–9.3)
NEUTROPHILS # BLD AUTO: 3.67 K/UL — SIGNIFICANT CHANGE UP (ref 1.4–6.5)
NEUTROPHILS NFR BLD AUTO: 68.7 % — SIGNIFICANT CHANGE UP (ref 42.2–75.2)
NRBC # BLD: 0 /100 WBCS — SIGNIFICANT CHANGE UP (ref 0–0)
PLATELET # BLD AUTO: 305 K/UL — SIGNIFICANT CHANGE UP (ref 130–400)
POTASSIUM SERPL-MCNC: 5.6 MMOL/L — HIGH (ref 3.5–5)
POTASSIUM SERPL-SCNC: 5.6 MMOL/L — HIGH (ref 3.5–5)
PROT SERPL-MCNC: 7.1 G/DL — SIGNIFICANT CHANGE UP (ref 6–8)
RBC # BLD: 4.93 M/UL — SIGNIFICANT CHANGE UP (ref 4.2–5.4)
RBC # FLD: 11.9 % — SIGNIFICANT CHANGE UP (ref 11.5–14.5)
SODIUM SERPL-SCNC: 137 MMOL/L — SIGNIFICANT CHANGE UP (ref 135–146)
WBC # BLD: 5.35 K/UL — SIGNIFICANT CHANGE UP (ref 4.8–10.8)
WBC # FLD AUTO: 5.35 K/UL — SIGNIFICANT CHANGE UP (ref 4.8–10.8)

## 2022-12-31 PROCEDURE — 74177 CT ABD & PELVIS W/CONTRAST: CPT | Mod: 26,MA

## 2022-12-31 PROCEDURE — 99285 EMERGENCY DEPT VISIT HI MDM: CPT

## 2022-12-31 NOTE — ED PROVIDER NOTE - PHYSICAL EXAMINATION
CONSTITUTIONAL: Well-appearing; well-nourished; in no apparent distress.   EYES: PERRL; EOM intact.   ENT: normal nose; no rhinorrhea; normal pharynx with no tonsillar hypertrophy.   NECK: Supple; non-tender; no cervical lymphadenopathy.   CARDIOVASCULAR: Normal S1, S2; no murmurs, rubs, or gallops.   RESPIRATORY: Normal chest excursion with respiration; breath sounds clear and equal bilaterally; no wheezes, rhonchi, or rales.  GI/: Normal bowel sounds; non-distended; + mild rt sided periumbilical ttp. No rebound or guarding. No cva ttp. neg murphys sign.   MS: No evidence of trauma or deformity. Normal ROM in all four extremities; non-tender to palpation; distal pulses are normal.   SKIN: Normal for age and race; warm; dry; good turgor; no apparent lesions or exudate.   NEURO/PSYCH: A & O x 4; grossly unremarkable. mood and manner are appropriate. Grooming and personal hygiene are appropriate.

## 2022-12-31 NOTE — ED PROVIDER NOTE - CLINICAL SUMMARY MEDICAL DECISION MAKING FREE TEXT BOX
.    41 y/o F pmh as noted, p/w upper abd pain. Pain improved in ED. Exam as noted. All available lab tests, imaging tests, and EKGs independently reviewed and interpreted by me. No acute serious pathology on CT imagig. Discussed all available results with Pt.  Pt understands results, plan of care, outpt follow up as discussed, and signs and symptoms for ED return.  Pt is comfortable with discharge. DC home.     .

## 2022-12-31 NOTE — ED PROVIDER NOTE - CONSIDERATION OF ADMISSION OBSERVATION
Given HPI, PMH, PE, w/up and ED course admission not indicated Consideration of Admission/Observation

## 2022-12-31 NOTE — ED ADULT TRIAGE NOTE - CHIEF COMPLAINT QUOTE
pt reports having abdominal surgery in October and was performing heavy lifting at work, believes she may have a hernia  c.o. abdominal pain

## 2022-12-31 NOTE — ED PROVIDER NOTE - NSFOLLOWUPINSTRUCTIONS_ED_ALL_ED_FT
Please follow up with your primary care doctor within one week.    Abdominal Pain, Adult  Abdominal pain can be caused by many things. Often, abdominal pain is not serious and it gets better with no treatment or by being treated at home. However, sometimes abdominal pain is serious. Your health care provider will do a medical history and a physical exam to try to determine the cause of your abdominal pain.    Follow these instructions at home:  Take over-the-counter and prescription medicines only as told by your health care provider. Do not take a laxative unless told by your health care provider.  ImageDrink enough fluid to keep your urine clear or pale yellow.  Watch your condition for any changes.  Keep all follow-up visits as told by your health care provider. This is important.  Contact a health care provider if:  Your abdominal pain changes or gets worse.  You are not hungry or you lose weight without trying.  You are constipated or have diarrhea for more than 2–3 days.  You have pain when you urinate or have a bowel movement.  Your abdominal pain wakes you up at night.  Your pain gets worse with meals, after eating, or with certain foods.  You are throwing up and cannot keep anything down.  You have a fever.  Get help right away if:  Your pain does not go away as soon as your health care provider told you to expect.  You cannot stop throwing up.  Your pain is only in areas of the abdomen, such as the right side or the left lower portion of the abdomen.  You have bloody or black stools, or stools that look like tar.  You have severe pain, cramping, or bloating in your abdomen.  You have signs of dehydration, such as:    Dark urine, very little urine, or no urine.  Cracked lips.  Dry mouth.  Sunken eyes.  Sleepiness.  Weakness.    This information is not intended to replace advice given to you by your health care provider. Make sure you discuss any questions you have with your health care provider.

## 2022-12-31 NOTE — ED PROVIDER NOTE - OBJECTIVE STATEMENT
42-year-old female with history of partial hysterectomy, endometriosis status post lysis of pelvic adhesions 10/22 presenting to ER for evaluation of abdominal pain.  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  Pain is worse with standing walking and is better with laying down.  She denies any alterations in bowel habits, constipation, fever, chills, nausea, vomiting, history of hernias.

## 2022-12-31 NOTE — ED PROVIDER NOTE - PATIENT PORTAL LINK FT
You can access the FollowMyHealth Patient Portal offered by [facility] by registering at the following website: [email or web address]. By joining [employer]’s FollowMyHealth portal, you will also be able to view your health information using other applications (apps) compatible with our system.

## 2022-12-31 NOTE — ED PROVIDER NOTE - NS ED ROS FT
Constitutional: no fever, chills, no recent weight loss, change in appetite or malaise  Eyes: no redness/discharge/pain/vision changes  ENT: no rhinorrhea/ear pain/sore throat  Cardiac: No chest pain, SOB or edema.  Respiratory: No cough or respiratory distress  GI: + rt sided abd pain. No nausea, vomiting, diarrhea   : No dysuria, frequency, urgency or hematuria  MS: no pain to back or extremities, no loss of ROM, no weakness  Neuro: No headache or weakness. No LOC.  Skin: No skin rash.  Endocrine: No history of thyroid disease or diabetes.  Except as documented in the HPI, all other systems are negative.

## 2023-02-01 ENCOUNTER — APPOINTMENT (OUTPATIENT)
Dept: ORTHOPEDIC SURGERY | Facility: CLINIC | Age: 43
End: 2023-02-01
Payer: OTHER MISCELLANEOUS

## 2023-02-01 PROCEDURE — 99243 OFF/OP CNSLTJ NEW/EST LOW 30: CPT

## 2023-02-01 PROCEDURE — 99072 ADDL SUPL MATRL&STAF TM PHE: CPT

## 2023-02-01 NOTE — ASSESSMENT
[FreeTextEntry1] : The patient comes in for schedule loss of use.  She is here status post injuring her right wrist.  She is doing relatively well.  She says there are times that she has some pain and she has to take some anti-inflammatories and rest the hand.  After she uses it a lot.  She does not have other complaints today.\par \par Right wrist:  Flexion 80°, extension 60°, radial deviation 10°, ulnar deviation 40°, full supination, full pronation\par dynanometer:  60 lb of force/55/42\par \par  left wrist: Flexion 90°, extension 75°, radial deviation 20°, ulnar deviation 50°, full  pronation, full supination\par \par dynanometer:  80 lb of force x3\par \par  patient has reached maximal medical improvement.  She will follow up with me as needed.

## 2023-02-01 NOTE — WORK
[Has the patient reached Maximum Medical Improvement? If yes, indicate date___] : Yes, on [unfilled] [Right] : right [FreeTextEntry7] : wrist [FreeTextEntry5] : 15

## 2023-03-11 ENCOUNTER — NON-APPOINTMENT (OUTPATIENT)
Age: 43
End: 2023-03-11

## 2023-04-14 ENCOUNTER — EMERGENCY (EMERGENCY)
Facility: HOSPITAL | Age: 43
LOS: 0 days | Discharge: HOME | End: 2023-04-14
Attending: EMERGENCY MEDICINE
Payer: OTHER MISCELLANEOUS

## 2023-04-14 VITALS
OXYGEN SATURATION: 99 % | DIASTOLIC BLOOD PRESSURE: 76 MMHG | WEIGHT: 167.99 LBS | SYSTOLIC BLOOD PRESSURE: 133 MMHG | RESPIRATION RATE: 18 BRPM | HEART RATE: 81 BPM | TEMPERATURE: 98 F
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
## 2023-04-14 DIAGNOSIS — S93.401A SPRAIN OF UNSPECIFIED LIGAMENT OF RIGHT ANKLE, INITIAL ENCOUNTER: ICD-10-CM
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
## 2023-04-14 DIAGNOSIS — M79.89 OTHER SPECIFIED SOFT TISSUE DISORDERS: ICD-10-CM

## 2023-04-14 DIAGNOSIS — Z90.710 ACQUIRED ABSENCE OF BOTH CERVIX AND UTERUS: Chronic | ICD-10-CM
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
## 2023-04-14 DIAGNOSIS — M25.571 PAIN IN RIGHT ANKLE AND JOINTS OF RIGHT FOOT: ICD-10-CM
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
## 2023-04-14 PROCEDURE — 73610 X-RAY EXAM OF ANKLE: CPT | Mod: 26,RT

## 2023-04-14 PROCEDURE — 99283 EMERGENCY DEPT VISIT LOW MDM: CPT | Mod: 25

## 2023-04-14 PROCEDURE — 99284 EMERGENCY DEPT VISIT MOD MDM: CPT

## 2023-04-14 PROCEDURE — 73610 X-RAY EXAM OF ANKLE: CPT | Mod: RT

## 2023-04-14 RX ORDER — IBUPROFEN 200 MG
600 TABLET ORAL ONCE
Refills: 0 | Status: COMPLETED | OUTPATIENT
Start: 2023-04-14 | End: 2023-04-14

## 2023-04-14 RX ADMIN — Medication 600 MILLIGRAM(S): at 22:28

## 2023-04-14 NOTE — ED PROVIDER NOTE - PATIENT PORTAL LINK FT
You can access the FollowMyHealth Patient Portal offered by [facility] by registering at the following website: [email or web address]. By joining [employer]’s FollowMyHealth portal, you will also be able to view your health information using other applications (apps) compatible with our system.

## 2023-04-14 NOTE — ED PROVIDER NOTE - CLINICAL SUMMARY MEDICAL DECISION MAKING FREE TEXT BOX
43 yo woman p/w R ankle pain s/p inversion injury while working  ambulatory afterwards and to ED    R ankle: + ttp anterior to lateral maleolous  no ttp over 5th metatarsal  no prox tib-fib ttp  from at ankle    XR, analgesia

## 2023-04-14 NOTE — ED PROVIDER NOTE - NSFOLLOWUPINSTRUCTIONS_ED_ALL_ED_FT
Ankle Sprain  Illustration of an ankle sprain caused by a foot turning outward and a foot turning inward.   An ankle sprain is a stretch or tear in a ligament in the ankle. Ligaments are tissues that connect bones to each other.    The two most common types of ankle sprains are:  Inversion sprain. This happens when the foot turns inward and the ankle rolls outward. It affects the ligament on the outside of the foot (lateral ligament).  Eversion sprain. This happens when the foot turns outward and the ankle rolls inward. It affects the ligament on the inner side of the foot (medial ligament).  What are the causes?  This condition is often caused by accidentally rolling or twisting the ankle.    What increases the risk?  You are more likely to develop this condition if you play sports.    What are the signs or symptoms?  Comparison of a normal ankle and an ankle that is swollen and bruised.  Symptoms of this condition include:  Pain in your ankle.  Swelling.  Bruising. This may develop right after you sprain your ankle or 1–2 days later.  Trouble standing or walking, especially when you turn or change directions.  How is this diagnosed?  This condition is diagnosed with:  A physical exam. During the exam, your health care provider will press on certain parts of your foot and ankle and try to move them in certain ways.  X-ray imaging. These may be taken to see how severe the sprain is and to check for broken bones.  How is this treated?  This condition may be treated with:  A brace or splint. This is used to keep the ankle from moving until it heals.  An elastic bandage. This is used to support the ankle.  Crutches.  Pain medicine.  Surgery. This may be needed if the sprain is severe.  Physical therapy. This may help to improve the range of motion in the ankle.  Follow these instructions at home:  If you have a brace or a splint:    Wear the brace or splint as told by your health care provider. Remove it only as told by your health care provider.  Loosen the brace or splint if your toes tingle, become numb, or turn cold and blue.  Keep the brace or splint clean.  If the brace or splint is not waterproof:  Do not let it get wet.  Cover it with a watertight covering when you take a bath or a shower.  If you have an elastic bandage (dressing):    Remove it to shower or bathe.  Try not to move your ankle much, but wiggle your toes from time to time. This helps to prevent swelling.  Adjust the dressing to make it more comfortable if it feels too tight.  Loosen the dressing if you have numbness or tingling in your foot, or if your foot becomes cold and blue.  Managing pain, stiffness, and swelling    A bag of ice on a towel on the skin.  Take over-the-counter and prescription medicines only as told by your health care provider.  For 2–3 days, keep your ankle raised (elevated) above the level of your heart as much as possible.  If directed, put ice on the injured area:  If you have a removable brace or splint, remove it as told by your health care provider.  Put ice in a plastic bag.  Place a towel between your skin and the bag.  Leave the ice on for 20 minutes, 2–3 times a day.  General instructions    Rest your ankle.  Do not use the injured limb to support your body weight until your health care provider says that you can. Use crutches as told by your health care provider.  Do not use any products that contain nicotine or tobacco, such as cigarettes, e-cigarettes, and chewing tobacco. If you need help quitting, ask your health care provider.  Keep all follow-up visits as told by your health care provider. This is important.  Contact a health care provider if:  You have rapidly increasing bruising or swelling.  Your pain is not relieved with medicine.  Get help right away if:  Your foot or toes become numb or blue.  You have severe pain that gets worse.  Summary  An ankle sprain is a stretch or tear in a ligament in the ankle. Ligaments are tissues that connect bones to each other.  This condition is often caused by accidentally rolling or twisting the ankle.  Symptoms include pain, swelling, bruising, and trouble walking.  To relieve pain and swelling, put ice on the affected ankle, raise your ankle above the level of your heart, and use an elastic bandage.  Keep all follow-up visits as told by your health care provider. This is important.  This information is not intended to replace advice given to you by your health care provider. Make sure you discuss any questions you have with your health care provider.

## 2023-04-14 NOTE — ED PROVIDER NOTE - PHYSICAL EXAMINATION
VITAL SIGNS: I have reviewed nursing notes and confirm.  CONSTITUTIONAL: Patient is in no acute distress.  SKIN: Skin exam is warm and dry, no acute rash.  HEAD: Normocephalic; atraumatic.  EYES: PERRL, EOM intact; conjunctiva and sclera clear.  ENT: No nasal discharge; airway clear.   EXT: Normal ROM. No edema. +Tenderness to anterior aspect of lateral malleolus. No tenderness to base of 5th metatarsal.   LYMPH: No acute cervical adenopathy.  NEURO: Alert, oriented. Grossly unremarkable. No focal deficits.  PSYCH: Cooperative, appropriate.

## 2023-04-14 NOTE — ED PROVIDER NOTE - OBJECTIVE STATEMENT
42-year-old female presenting for evaluation of right ankle pain and swelling after rolling it while at work today.  Patient has been able to bear weight but with slight pain.  No numbness or tingling or weakness.

## 2023-04-14 NOTE — ED PROVIDER NOTE - CARE PROVIDER_API CALL
Mitchel Arroyo (MD)  Orthopaedic Surgery  [address] [place] [address]  Phone: [phone]  Fax: [phone]  Follow Up Time: 1-3 Days

## 2023-05-26 ENCOUNTER — NON-APPOINTMENT (OUTPATIENT)
Age: 43
End: 2023-05-26

## 2023-06-01 ENCOUNTER — EMERGENCY (EMERGENCY)
Facility: HOSPITAL | Age: 43
LOS: 0 days | Discharge: HOME | End: 2023-06-02
Attending: EMERGENCY MEDICINE
Payer: COMMERCIAL

## 2023-06-01 VITALS
TEMPERATURE: 98 F | OXYGEN SATURATION: 99 % | HEART RATE: 74 BPM | RESPIRATION RATE: 18 BRPM | DIASTOLIC BLOOD PRESSURE: 75 MMHG | SYSTOLIC BLOOD PRESSURE: 122 MMHG

## 2023-06-01 DIAGNOSIS — Z77.21 CONTACT WITH AND (SUSPECTED) EXPOSURE TO POTENTIALLY HAZARDOUS BODY FLUIDS: ICD-10-CM

## 2023-06-01 DIAGNOSIS — Z90.710 ACQUIRED ABSENCE OF BOTH CERVIX AND UTERUS: Chronic | ICD-10-CM

## 2023-06-01 PROCEDURE — 36415 COLL VENOUS BLD VENIPUNCTURE: CPT

## 2023-06-01 PROCEDURE — 99282 EMERGENCY DEPT VISIT SF MDM: CPT

## 2023-06-01 PROCEDURE — 99284 EMERGENCY DEPT VISIT MOD MDM: CPT

## 2023-06-01 NOTE — ED ADULT NURSE NOTE - CAS ELECT INFOMATION PROVIDED
pt instructed to follow up with employee health, take medication as discussed, return to ed for new or worsening symptoms/DC instructions

## 2023-06-01 NOTE — ED ADULT NURSE NOTE - NSFALLUNIVINTERV_ED_ALL_ED
Bed/Stretcher in lowest position, wheels locked, appropriate side rails in place/Call bell, personal items and telephone in reach/Instruct patient to call for assistance before getting out of bed/chair/stretcher/Non-slip footwear applied when patient is off stretcher/[place] to call system/Physically safe environment - no spills, clutter or unnecessary equipment/Purposeful proactive rounding/Room/bathroom lighting operational, light cord in reach
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
## 2023-06-01 NOTE — ED ADULT NURSE NOTE - NSFALLRISKASMT_ED_ALL_ED_DT
This writer left a message on voicemail of mother. Provided intake's number for call back.  Patient was seen by Dr. Gloria 10/17/2017 and Behavioral Health assessment scanned \"psychosocial\" 08/02/2018.   02-Jun-2023 00:29

## 2023-06-02 RX ORDER — POLYMYXIN B SULF/TRIMETHOPRIM 10000-1/ML
1 DROPS OPHTHALMIC (EYE) ONCE
Refills: 0 | Status: DISCONTINUED | OUTPATIENT
Start: 2023-06-02 | End: 2023-06-02

## 2023-06-02 NOTE — ED ADULT NURSE REASSESSMENT NOTE - NS ED NURSE REASSESS COMMENT FT1
pt given PEP pack and abx eyedrops, instructed how to take medication and to follow up with employee health in 3 days
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
## 2023-06-02 NOTE — ED PROVIDER NOTE - PATIENT PORTAL LINK FT
You can access the FollowMyHealth Patient Portal offered by [facility] by registering at the following website: [email or web address]. By joining [employer]’s FollowMyHealth portal, you will also be able to view your health information using other applications (apps) compatible with our system.

## 2023-06-14 ENCOUNTER — APPOINTMENT (OUTPATIENT)
Dept: OBGYN | Facility: CLINIC | Age: 43
End: 2023-06-14

## 2023-07-02 ENCOUNTER — EMERGENCY (EMERGENCY)
Facility: HOSPITAL | Age: 43
LOS: 0 days | Discharge: HOME | End: 2023-07-02
Attending: EMERGENCY MEDICINE
Payer: COMMERCIAL

## 2023-07-02 VITALS
RESPIRATION RATE: 18 BRPM | HEART RATE: 78 BPM | OXYGEN SATURATION: 99 % | SYSTOLIC BLOOD PRESSURE: 104 MMHG | DIASTOLIC BLOOD PRESSURE: 61 MMHG | TEMPERATURE: 97 F

## 2023-07-02 DIAGNOSIS — T59.91XA TOXIC EFFECT OF UNSPECIFIED GASES, FUMES AND VAPORS, ACCIDENTAL (UNINTENTIONAL), INITIAL ENCOUNTER: ICD-10-CM
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
## 2023-07-02 DIAGNOSIS — Z90.710 ACQUIRED ABSENCE OF BOTH CERVIX AND UTERUS: Chronic | ICD-10-CM
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
## 2023-07-02 PROCEDURE — 99283 EMERGENCY DEPT VISIT LOW MDM: CPT

## 2023-07-02 PROCEDURE — 99282 EMERGENCY DEPT VISIT SF MDM: CPT
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
## 2023-07-02 NOTE — ED PROVIDER NOTE - PHYSICAL EXAMINATION
GEN: female in no distress.   HEENT: non icteric conjunctiva pink. mucosa normal. throat normal. EOMI PERRLA  CHEST: CTA bilateral. normal work of breathing. no accessory muscle use  NECK: normal ROM   CV: pulses intact S1S2  EXT: FROM x 4    NEURO: AAO 3 no focal deficits. Gait memory speech cognition and coordination grossly intact.   SKIN: no pallor no diaphoresis  PSYCH: normal mood and mentation

## 2023-07-02 NOTE — ED PROVIDER NOTE - PATIENT PORTAL LINK FT
You can access the FollowMyHealth Patient Portal offered by [facility] by registering at the following website: [email or web address]. By joining [employer]’s FollowMyHealth portal, you will also be able to view your health information using other applications (apps) compatible with our system.
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
## 2023-08-09 ENCOUNTER — NON-APPOINTMENT (OUTPATIENT)
Age: 43
End: 2023-08-09

## 2023-08-09 ENCOUNTER — APPOINTMENT (OUTPATIENT)
Dept: OBGYN | Facility: CLINIC | Age: 43
End: 2023-08-09
Payer: COMMERCIAL

## 2023-08-09 VITALS
HEIGHT: 67 IN | WEIGHT: 160 LBS | DIASTOLIC BLOOD PRESSURE: 62 MMHG | BODY MASS INDEX: 25.11 KG/M2 | HEART RATE: 84 BPM | SYSTOLIC BLOOD PRESSURE: 88 MMHG

## 2023-08-09 DIAGNOSIS — N94.10 UNSPECIFIED DYSPAREUNIA: ICD-10-CM

## 2023-08-09 DIAGNOSIS — Z01.419 ENCOUNTER FOR GYNECOLOGICAL EXAMINATION (GENERAL) (ROUTINE) W/OUT ABNORMAL FINDINGS: ICD-10-CM

## 2023-08-09 DIAGNOSIS — N80.9 ENDOMETRIOSIS, UNSPECIFIED: ICD-10-CM

## 2023-08-09 PROCEDURE — 99396 PREV VISIT EST AGE 40-64: CPT | Mod: 25

## 2023-08-09 PROCEDURE — 76830 TRANSVAGINAL US NON-OB: CPT

## 2023-08-09 NOTE — HISTORY OF PRESENT ILLNESS
[FreeTextEntry1] : here for annual pain has returned....severe..cannot have sex due to pain requests BSO started on prometrium 200mg by a hormone doctor yesterday. Stopped the prescribed endo suppression, norethindrone 5mg qd, after only a few days...but did not inform us. [Diffuse] : diffuse [[place]] : intercourse [TextBox_10] : severe [TextBox_29] : states: "endometriosis pain"

## 2023-08-09 NOTE — PROCEDURE
[Pelvic Pain] : pelvic pain [Transvaginal Ultrasound] : transvaginal ultrasound [Absent] : absent [FreeTextEntry3] : intestinal activity ; extreme movt CERVIX ABSENT  NO FREE FLUID OVARIES TINY [FreeTextEntry7] : 1 CC [FreeTextEntry8] : 0.4 CC

## 2023-08-09 NOTE — REVIEW OF SYSTEMS
[Abdominal Pain] : abdominal pain [Vomiting] : no vomiting [Nausea] : no nausea [Pelvic pain] : pelvic pain [Negative] : Breast

## 2023-08-09 NOTE — PHYSICAL EXAM
[Chaperone Present] : A chaperone was present in the examining room during all aspects of the physical examination [Examination Of The Breasts] : a normal appearance [No Masses] : no breast masses were palpable [Labia Majora] : normal [Labia Minora] : normal [Normal] : normal [Tenderness] : tenderness [Absent] : absent [Uterine Adnexae] : normal [FreeTextEntry4] : cuff very tender

## 2023-08-12 LAB — HPV HIGH+LOW RISK DNA PNL CVX: NOT DETECTED

## 2023-08-15 LAB — CYTOLOGY CVX/VAG DOC THIN PREP: NORMAL

## 2023-11-11 ENCOUNTER — RX RENEWAL (OUTPATIENT)
Age: 43
End: 2023-11-11

## 2023-11-11 RX ORDER — PROGESTERONE 200 MG/1
200 CAPSULE ORAL
Qty: 180 | Refills: 0 | Status: ACTIVE | COMMUNITY
Start: 2023-08-09 | End: 1900-01-01

## 2023-12-08 NOTE — ED PROVIDER NOTE - CONDITION AT DISCHARGE:
Pt abdiel back and clarified they would like their appt BEFORE their MRI - this is per pts primary provider. 12/11 appt unchanged   
Improved

## 2023-12-20 ENCOUNTER — APPOINTMENT (OUTPATIENT)
Dept: OBGYN | Facility: CLINIC | Age: 43
End: 2023-12-20

## 2023-12-27 ENCOUNTER — EMERGENCY (EMERGENCY)
Facility: HOSPITAL | Age: 43
LOS: 0 days | Discharge: HOME | End: 2023-12-27
Attending: EMERGENCY MEDICINE
Payer: COMMERCIAL

## 2023-12-27 VITALS
RESPIRATION RATE: 20 BRPM | HEART RATE: 78 BPM | WEIGHT: 147.93 LBS | HEIGHT: 66 IN | TEMPERATURE: 98 F | SYSTOLIC BLOOD PRESSURE: 106 MMHG | DIASTOLIC BLOOD PRESSURE: 73 MMHG | OXYGEN SATURATION: 99 %

## 2023-12-27 VITALS — DIASTOLIC BLOOD PRESSURE: 60 MMHG | SYSTOLIC BLOOD PRESSURE: 100 MMHG | TEMPERATURE: 98 F | HEART RATE: 71 BPM

## 2023-12-27 DIAGNOSIS — R11.2 NAUSEA WITH VOMITING, UNSPECIFIED: ICD-10-CM

## 2023-12-27 DIAGNOSIS — R07.89 OTHER CHEST PAIN: ICD-10-CM

## 2023-12-27 DIAGNOSIS — Z90.710 ACQUIRED ABSENCE OF BOTH CERVIX AND UTERUS: Chronic | ICD-10-CM

## 2023-12-27 LAB
ALBUMIN SERPL ELPH-MCNC: 3.8 G/DL — SIGNIFICANT CHANGE UP (ref 3.5–5.2)
ALBUMIN SERPL ELPH-MCNC: 3.8 G/DL — SIGNIFICANT CHANGE UP (ref 3.5–5.2)
ALP SERPL-CCNC: 46 U/L — SIGNIFICANT CHANGE UP (ref 30–115)
ALP SERPL-CCNC: 46 U/L — SIGNIFICANT CHANGE UP (ref 30–115)
ALT FLD-CCNC: 7 U/L — SIGNIFICANT CHANGE UP (ref 0–41)
ALT FLD-CCNC: 7 U/L — SIGNIFICANT CHANGE UP (ref 0–41)
ANION GAP SERPL CALC-SCNC: 9 MMOL/L — SIGNIFICANT CHANGE UP (ref 7–14)
ANION GAP SERPL CALC-SCNC: 9 MMOL/L — SIGNIFICANT CHANGE UP (ref 7–14)
AST SERPL-CCNC: 11 U/L — SIGNIFICANT CHANGE UP (ref 0–41)
AST SERPL-CCNC: 11 U/L — SIGNIFICANT CHANGE UP (ref 0–41)
BASOPHILS # BLD AUTO: 0.03 K/UL — SIGNIFICANT CHANGE UP (ref 0–0.2)
BASOPHILS # BLD AUTO: 0.03 K/UL — SIGNIFICANT CHANGE UP (ref 0–0.2)
BASOPHILS NFR BLD AUTO: 0.4 % — SIGNIFICANT CHANGE UP (ref 0–1)
BASOPHILS NFR BLD AUTO: 0.4 % — SIGNIFICANT CHANGE UP (ref 0–1)
BILIRUB SERPL-MCNC: 0.5 MG/DL — SIGNIFICANT CHANGE UP (ref 0.2–1.2)
BILIRUB SERPL-MCNC: 0.5 MG/DL — SIGNIFICANT CHANGE UP (ref 0.2–1.2)
BUN SERPL-MCNC: 12 MG/DL — SIGNIFICANT CHANGE UP (ref 10–20)
BUN SERPL-MCNC: 12 MG/DL — SIGNIFICANT CHANGE UP (ref 10–20)
CALCIUM SERPL-MCNC: 8.5 MG/DL — SIGNIFICANT CHANGE UP (ref 8.4–10.5)
CALCIUM SERPL-MCNC: 8.5 MG/DL — SIGNIFICANT CHANGE UP (ref 8.4–10.5)
CHLORIDE SERPL-SCNC: 108 MMOL/L — SIGNIFICANT CHANGE UP (ref 98–110)
CHLORIDE SERPL-SCNC: 108 MMOL/L — SIGNIFICANT CHANGE UP (ref 98–110)
CO2 SERPL-SCNC: 22 MMOL/L — SIGNIFICANT CHANGE UP (ref 17–32)
CO2 SERPL-SCNC: 22 MMOL/L — SIGNIFICANT CHANGE UP (ref 17–32)
CREAT SERPL-MCNC: 0.7 MG/DL — SIGNIFICANT CHANGE UP (ref 0.7–1.5)
CREAT SERPL-MCNC: 0.7 MG/DL — SIGNIFICANT CHANGE UP (ref 0.7–1.5)
EGFR: 110 ML/MIN/1.73M2 — SIGNIFICANT CHANGE UP
EGFR: 110 ML/MIN/1.73M2 — SIGNIFICANT CHANGE UP
EOSINOPHIL # BLD AUTO: 0.31 K/UL — SIGNIFICANT CHANGE UP (ref 0–0.7)
EOSINOPHIL # BLD AUTO: 0.31 K/UL — SIGNIFICANT CHANGE UP (ref 0–0.7)
EOSINOPHIL NFR BLD AUTO: 3.8 % — SIGNIFICANT CHANGE UP (ref 0–8)
EOSINOPHIL NFR BLD AUTO: 3.8 % — SIGNIFICANT CHANGE UP (ref 0–8)
GLUCOSE SERPL-MCNC: 101 MG/DL — HIGH (ref 70–99)
GLUCOSE SERPL-MCNC: 101 MG/DL — HIGH (ref 70–99)
HCG SERPL QL: NEGATIVE — SIGNIFICANT CHANGE UP
HCG SERPL QL: NEGATIVE — SIGNIFICANT CHANGE UP
HCT VFR BLD CALC: 36.1 % — LOW (ref 37–47)
HCT VFR BLD CALC: 36.1 % — LOW (ref 37–47)
HGB BLD-MCNC: 12.4 G/DL — SIGNIFICANT CHANGE UP (ref 12–16)
HGB BLD-MCNC: 12.4 G/DL — SIGNIFICANT CHANGE UP (ref 12–16)
IMM GRANULOCYTES NFR BLD AUTO: 0.1 % — SIGNIFICANT CHANGE UP (ref 0.1–0.3)
IMM GRANULOCYTES NFR BLD AUTO: 0.1 % — SIGNIFICANT CHANGE UP (ref 0.1–0.3)
LIDOCAIN IGE QN: 41 U/L — SIGNIFICANT CHANGE UP (ref 7–60)
LIDOCAIN IGE QN: 41 U/L — SIGNIFICANT CHANGE UP (ref 7–60)
LYMPHOCYTES # BLD AUTO: 1.13 K/UL — LOW (ref 1.2–3.4)
LYMPHOCYTES # BLD AUTO: 1.13 K/UL — LOW (ref 1.2–3.4)
LYMPHOCYTES # BLD AUTO: 14 % — LOW (ref 20.5–51.1)
LYMPHOCYTES # BLD AUTO: 14 % — LOW (ref 20.5–51.1)
MAGNESIUM SERPL-MCNC: 1.8 MG/DL — SIGNIFICANT CHANGE UP (ref 1.8–2.4)
MAGNESIUM SERPL-MCNC: 1.8 MG/DL — SIGNIFICANT CHANGE UP (ref 1.8–2.4)
MCHC RBC-ENTMCNC: 29.5 PG — SIGNIFICANT CHANGE UP (ref 27–31)
MCHC RBC-ENTMCNC: 29.5 PG — SIGNIFICANT CHANGE UP (ref 27–31)
MCHC RBC-ENTMCNC: 34.3 G/DL — SIGNIFICANT CHANGE UP (ref 32–37)
MCHC RBC-ENTMCNC: 34.3 G/DL — SIGNIFICANT CHANGE UP (ref 32–37)
MCV RBC AUTO: 85.7 FL — SIGNIFICANT CHANGE UP (ref 81–99)
MCV RBC AUTO: 85.7 FL — SIGNIFICANT CHANGE UP (ref 81–99)
MONOCYTES # BLD AUTO: 0.52 K/UL — SIGNIFICANT CHANGE UP (ref 0.1–0.6)
MONOCYTES # BLD AUTO: 0.52 K/UL — SIGNIFICANT CHANGE UP (ref 0.1–0.6)
MONOCYTES NFR BLD AUTO: 6.4 % — SIGNIFICANT CHANGE UP (ref 1.7–9.3)
MONOCYTES NFR BLD AUTO: 6.4 % — SIGNIFICANT CHANGE UP (ref 1.7–9.3)
NEUTROPHILS # BLD AUTO: 6.07 K/UL — SIGNIFICANT CHANGE UP (ref 1.4–6.5)
NEUTROPHILS # BLD AUTO: 6.07 K/UL — SIGNIFICANT CHANGE UP (ref 1.4–6.5)
NEUTROPHILS NFR BLD AUTO: 75.3 % — HIGH (ref 42.2–75.2)
NEUTROPHILS NFR BLD AUTO: 75.3 % — HIGH (ref 42.2–75.2)
NRBC # BLD: 0 /100 WBCS — SIGNIFICANT CHANGE UP (ref 0–0)
NRBC # BLD: 0 /100 WBCS — SIGNIFICANT CHANGE UP (ref 0–0)
PLATELET # BLD AUTO: 262 K/UL — SIGNIFICANT CHANGE UP (ref 130–400)
PLATELET # BLD AUTO: 262 K/UL — SIGNIFICANT CHANGE UP (ref 130–400)
PMV BLD: 10.5 FL — HIGH (ref 7.4–10.4)
PMV BLD: 10.5 FL — HIGH (ref 7.4–10.4)
POTASSIUM SERPL-MCNC: 4.1 MMOL/L — SIGNIFICANT CHANGE UP (ref 3.5–5)
POTASSIUM SERPL-MCNC: 4.1 MMOL/L — SIGNIFICANT CHANGE UP (ref 3.5–5)
POTASSIUM SERPL-SCNC: 4.1 MMOL/L — SIGNIFICANT CHANGE UP (ref 3.5–5)
POTASSIUM SERPL-SCNC: 4.1 MMOL/L — SIGNIFICANT CHANGE UP (ref 3.5–5)
PROT SERPL-MCNC: 5.9 G/DL — LOW (ref 6–8)
PROT SERPL-MCNC: 5.9 G/DL — LOW (ref 6–8)
RBC # BLD: 4.21 M/UL — SIGNIFICANT CHANGE UP (ref 4.2–5.4)
RBC # BLD: 4.21 M/UL — SIGNIFICANT CHANGE UP (ref 4.2–5.4)
RBC # FLD: 12 % — SIGNIFICANT CHANGE UP (ref 11.5–14.5)
RBC # FLD: 12 % — SIGNIFICANT CHANGE UP (ref 11.5–14.5)
SODIUM SERPL-SCNC: 139 MMOL/L — SIGNIFICANT CHANGE UP (ref 135–146)
SODIUM SERPL-SCNC: 139 MMOL/L — SIGNIFICANT CHANGE UP (ref 135–146)
TROPONIN SAMPLING TIME: SIGNIFICANT CHANGE UP
TROPONIN SAMPLING TIME: SIGNIFICANT CHANGE UP
TROPONIN T, HIGH SENSITIVITY RESULT: <6 NG/L — SIGNIFICANT CHANGE UP (ref 6–13)
TROPONIN T, HIGH SENSITIVITY RESULT: <6 NG/L — SIGNIFICANT CHANGE UP (ref 6–13)
WBC # BLD: 8.07 K/UL — SIGNIFICANT CHANGE UP (ref 4.8–10.8)
WBC # BLD: 8.07 K/UL — SIGNIFICANT CHANGE UP (ref 4.8–10.8)
WBC # FLD AUTO: 8.07 K/UL — SIGNIFICANT CHANGE UP (ref 4.8–10.8)
WBC # FLD AUTO: 8.07 K/UL — SIGNIFICANT CHANGE UP (ref 4.8–10.8)

## 2023-12-27 PROCEDURE — 99285 EMERGENCY DEPT VISIT HI MDM: CPT | Mod: 25

## 2023-12-27 PROCEDURE — 85025 COMPLETE CBC W/AUTO DIFF WBC: CPT

## 2023-12-27 PROCEDURE — 80053 COMPREHEN METABOLIC PANEL: CPT

## 2023-12-27 PROCEDURE — 99285 EMERGENCY DEPT VISIT HI MDM: CPT

## 2023-12-27 PROCEDURE — 83690 ASSAY OF LIPASE: CPT

## 2023-12-27 PROCEDURE — 93010 ELECTROCARDIOGRAM REPORT: CPT

## 2023-12-27 PROCEDURE — 84484 ASSAY OF TROPONIN QUANT: CPT

## 2023-12-27 PROCEDURE — 93005 ELECTROCARDIOGRAM TRACING: CPT

## 2023-12-27 PROCEDURE — 71046 X-RAY EXAM CHEST 2 VIEWS: CPT | Mod: 26

## 2023-12-27 PROCEDURE — 83735 ASSAY OF MAGNESIUM: CPT

## 2023-12-27 PROCEDURE — 84703 CHORIONIC GONADOTROPIN ASSAY: CPT

## 2023-12-27 PROCEDURE — 71046 X-RAY EXAM CHEST 2 VIEWS: CPT

## 2023-12-27 PROCEDURE — 36415 COLL VENOUS BLD VENIPUNCTURE: CPT

## 2023-12-27 NOTE — ED PROVIDER NOTE - ATTENDING APP SHARED VISIT CONTRIBUTION OF CARE
I reviewed and verified the documentation and  and independently performed the documented    43-year-old female appendectomy, endometriosis presents with left upper chest pain intermittent for 2 weeks no trigger not worse with eating not worse with exertion dull in nature no fever cough shortness of breath.  Patient here today she had vomiting x 4 nonbloody , bile denies any abdominal pain. Patient well-appearing nontoxic CVS RRR respiratory clear to auscultation abdomen soft nontender  I  Dr Wilson performed independent interpretation of EKG  -  nsr no stemi no acute ischemia

## 2023-12-27 NOTE — ED PROVIDER NOTE - CLINICAL SUMMARY MEDICAL DECISION MAKING FREE TEXT BOX
Rosmery - Patient signed out to me follow-up x-ray and labs for evaluation.  Patient presents with about 3 weeks of left-sided chest pain nonradiating nonexertional no diaphoresis.  No cardiac risk factors. In addition,  patient with mult episodes of vomiting last night.  Here abdomen soft nontender S1-S2 clear auscultation bilaterally.  Workup negative.  Low heart score troponin negative stable for discharge outpatient follow-up.

## 2023-12-27 NOTE — ED PROVIDER NOTE - NSFOLLOWUPINSTRUCTIONS_ED_ALL_ED_FT
Chest Pain    Chest pain can be caused by many different conditions which may or may not be dangerous. Causes include heartburn, lung infections, heart attack, blood clot in lungs, skin infections, strain or damage to muscle, cartilage, or bones, etc. In addition to a history and physical examination, an electrocardiogram (ECG) or other lab tests may have been performed to determine the cause of your chest pain. Follow up with your primary care provider or with a cardiologist as instructed.     SEEK IMMEDIATE MEDICAL CARE IF YOU HAVE ANY OF THE FOLLOWING SYMPTOMS: worsening chest pain, coughing up blood, unexplained back/neck/jaw pain, severe abdominal pain, dizziness or lightheadedness, fainting, shortness of breath, sweaty or clammy skin, vomiting, or racing heart beat. These symptoms may represent a serious problem that is an emergency. Do not wait to see if the symptoms will go away. Get medical help right away. Call 911 and do not drive yourself to the hospital.    Nausea / Vomiting    Nausea is the feeling that you have to vomit. As nausea gets worse, it can lead to vomiting. Vomiting puts you at an increased risk for dehydration. Older adults and people with other diseases or a weak immune system are at higher risk for dehydration. Drink clear fluids in small but frequent amounts as tolerated. Eat bland, easy-to-digest foods in small amounts as tolerated.    SEEK IMMEDIATE MEDICAL CARE IF YOU HAVE ANY OF THE FOLLOWING SYMPTOMS: fever, inability to keep sufficient fluids down, black or bloody vomitus, black or bloody stools, lightheadedness/dizziness, chest pain, severe headache, rash, shortness of breath, cold or clammy skin, confusion, pain with urination, or any signs of dehydration.

## 2023-12-27 NOTE — ED PROVIDER NOTE - DIFFERENTIAL DIAGNOSIS
Differential Diagnosis Acute coronary syndrome, Stable angina , Pneumonia Viral pleuritis.  GERD.Costochondritis.Anxiety or panic disorder, Aortic dissection, myocarditis, pericarditis, zoster, gerd, dehydration, viral illness

## 2023-12-27 NOTE — ED PROVIDER NOTE - OBJECTIVE STATEMENT
43 year old female no sig past medical history comes to emergency room for left upper chest pain on and off for the last week with nausea and vomiting today. patient states that tonight woke up out of sleep and she vomited several times and called 911. patient received zofran and fluids and felt better and was transported to emergency room.

## 2023-12-27 NOTE — ED ADULT NURSE NOTE - NSFALLUNIVINTERV_ED_ALL_ED
Bed/Stretcher in lowest position, wheels locked, appropriate side rails in place/Call bell, personal items and telephone in reach/Instruct patient to call for assistance before getting out of bed/chair/stretcher/Non-slip footwear applied when patient is off stretcher/[place] to call system/Physically safe environment - no spills, clutter or unnecessary equipment/Purposeful proactive rounding/Room/bathroom lighting operational, light cord in reach Bed/Stretcher in lowest position, wheels locked, appropriate side rails in place/Call bell, personal items and telephone in reach/Instruct patient to call for assistance before getting out of bed/chair/stretcher/Non-slip footwear applied when patient is off stretcher/[place] to call system/Physically safe environment - no spills, clutter or unnecessary equipment/Purposeful proactive rounding/Room/bathroom lighting operational, light cord in reach

## 2024-01-10 NOTE — ASU PATIENT PROFILE, ADULT - TEACHING/LEARNING LEARNING PREFERENCES
Subjective   Jean is a 2 y.o.  who presents today with his dad for his Health Maintenance and Supervision Exam.    General Health:  Jean is in overall good health  Concerns today:     Social and Family History:  Parental support, work/family balance: Yes  Lives with: parents   He is at      Nutrition:  Current Diet: overall does well. Somedays not hungry    Dental Care:  Jean has a dental home: no  Dental hygiene regularly performed: yes  Fluoridate water: Yes    Elimination:  Elimination patterns appropriate: Yes  Ready for toilet training:  yes  Toilet training in process: yes, fear of BM in potty  Bowel control:  Daytime control:   Nighttime control:    Sleep:  Sleep patterns appropriate: yes  Sleep location: crib  Sleep problems: No     Behavior/Socialization:  Age appropriate: Yes  Temper tantrums managed appropriately: Yes  Appropriate parental responses to behavior: Yes  Choices offered to child: Yes    Development:  normal for age, no cognitive or motor delay identified  MCHAT 0/1 unremarkable    Activities:  Interactive Playtime: Yes  Physical Activity: Yes  Limited screen/media use: Yes    Risk Assessment:  Additional health risks: no  Lead risk no    Safety Assessment:  Safety topics reviewed: Yes    Objective     Gen: Patient is alert and in NAD.    HEENT: Head is NC/AT. PERRL. EOMI. No conjunctival injection present. No esotropia or exotropia present. TMs are transparent with good landmarks. Nasopharynx is without significant edema or rhinorrhea. Oropharynx is clear with MMM. No tonsillar enlargement or exudates present. Good dentition.  Neck: Supple; no lymphadenopathy or masses.    CV: RRR, NL S1/S2, no murmurs.    Resp: CTA bilaterally, no wheezes or rhonchi; work of breathing is NL.     Abdomen: Soft, non-tender, non-distended; no HSM or masses; positive bowel sounds.   : normal circumcised male, testes descended  Edwin stage 1.   Musculoskeletal: Extremities are warm and dry without  abnormalities   Neuro: NL muscle tone, strength, and reflexes.   Skin: No significant rashes or lesions.     Assessment/Plan   Healthy 2 y.o. male child.  1. Anticipatory guidance discussed. Safety issues discussed. Gave handout on well-child issues for age  2. Vision screen  3. Fluoride applied  4. Immunizations per orders if needed  5. Follow-up visit in 1 yr for next well child visit, or sooner as needed.    verbal instruction

## 2024-01-17 ENCOUNTER — NON-APPOINTMENT (OUTPATIENT)
Age: 44
End: 2024-01-17

## 2024-04-09 NOTE — ED PROVIDER NOTE - NSFOLLOWUPINSTRUCTIONS_ED_ALL_ED_FT
CVA Cordell confirmed by patient   Follow up with [facility] within 3 days    .Body Fluid Exposure Information  People come in contact with blood and other body fluids in many ways. Sometimes, body fluids may have germs (bacteria or viruses) that can cause infections. These germs can be spread when an infected person's body fluids come in contact with the mouth, nose, eyes, genitals, or broken skin of another person. Broken skin includes chapped skin, cuts, abrasions, or irritation and swelling of the skin (dermatitis).    You are more likely to be exposed to infected body fluids if you:  Are a health care worker or family member who is taking care of a sick person.  Use needles to inject drugs, and you share needles with other users.  Have sex or engage in other sexual activities without using a condom or other protection.  The risk of an infection spreading through exposure to body fluids is small and depends on several factors. These include:  The type of body fluid.  How you were exposed to the body fluid.  The type of infection.  The risk factors of the person who is the source of the body fluid. Your health care provider can help you assess the risk.  What types of body fluids can spread infection?  The following body fluids can spread infections:  Blood.  Semen.  Vaginal secretions.  Urine.  Feces.  Saliva.  Nasal or eye discharge.  Breast milk.  Amniotic fluid.  Fluids surrounding body organs.  Pus or other fluids coming from a wound.  What are some first-aid measures for body fluid exposure?  The following steps should be taken as soon as possible after you are exposed to body fluids:    Intact skin    For contact with closed skin, wash the area with soap and water. If soap and water are not available, use hand .  Broken skin    For contact with broken skin:  Let the area bleed a little.  Wash the area well with soap and water. If soap is not available, use just water or hand .  Place a bandage or clean towel on the wound and apply gentle pressure to stop the bleeding. Do not squeeze or rub the area.  Do not use harsh chemicals such as bleach or iodine.    Eyes    Rinse your eyes with water or saline solution for 30 seconds or longer.  If you are wearing contact lenses, leave the contact lenses in while rinsing your eyes. After the rinsing is complete, remove the contact lenses.  Mouth    Spit out the fluids. Rinse with water 4–5 times, spitting it out each time.  When should I seek help?  After performing first aid:  Call your health care provider or seek emergency care right away if blood or other body fluids made contact with broken skin or the eyes, nose, mouth, or genitals.  Tell your work supervisor immediately if the exposure to body fluid happened in the workplace. Follow your company's procedures for dealing with body fluid exposure.  What will happen after I report the exposure?  Your health care provider will ask you several questions, including questions about:  Your medical history, including vaccine records.  Date and time of the exposure.  Whether you saw body fluids during the exposure.  Type of body fluid you were exposed to.  Volume of body fluid you were exposed to.  How the exposure happened.  Any devices used, such as needles.  The area of your body that made contact with the body fluid.  Any injury to your skin or other areas.  How long contact was made with the body fluid.  Whether the person whose body fluid you were exposed to has certain risk factors or health conditions, if known.  Your health care provider will assess your risk for infection. Often, no treatment is necessary. However, in some cases:  Your health care provider may recommend doing blood tests right away.  Follow-up blood tests may also be done at certain times during the upcoming weeks and months to check for any changes.  You may be offered treatment to prevent infection after exposure (post-exposure prophylaxis). This may include certain vaccines or medicines. This is necessary when there is a risk of a serious infection, such as HIV (human immunodeficiency virus) or hepatitis B. Your health care provider will discuss the right treatment and vaccines with you.  How can I prevent infection?  To reduce your chances of getting an infection    Make sure your vaccines are up to date, including vaccines for tetanus and hepatitis.  Learn and follow any guidelines for preventing exposure (universal precautions) that are provided at your workplace.  Keep all follow-up visits as told by your health care provider. This is important. You will need to be monitored after you are evaluated for exposure to body fluids.  To avoid spreading infection to others      Wash your hands frequently with soap and water. If soap and water are not available, use hand .  Do not share toothbrushes, razors, dental floss, or other personal items.  Keep open wounds covered.  Dispose of any items with blood on them by putting them in the trash. This includes razors, tampons, and bandages.  Do not have sex or engage in sexual activities until you know you are free of infection.  Do not donate blood, plasma, breast milk, sperm, or other body fluids.  Do not share drug supplies with others. These include needles, syringes, straws, and pipes.  Follow all instructions from your health care provider for preventing the spread of infection.  Summary  Contact with blood and other body fluids can happen in many ways. Sometimes, body fluids may have germs that can cause an infection.  Treatment depends on the type of body fluid you were exposed to and what part of your body was exposed.  Call your health care provider or seek emergency care right away if blood or other body fluids made contact with broken skin or the eyes, nose, mouth, or genitals.  Make sure all your vaccines are up to date, including vaccines for tetanus and hepatitis.  This information is not intended to replace advice given to you by your health care provider. Make sure you discuss any questions you have with your health care provider.

## 2024-04-24 ENCOUNTER — APPOINTMENT (OUTPATIENT)
Dept: ORTHOPEDIC SURGERY | Facility: CLINIC | Age: 44
End: 2024-04-24
Payer: OTHER MISCELLANEOUS

## 2024-04-24 PROCEDURE — 73110 X-RAY EXAM OF WRIST: CPT | Mod: RT

## 2024-04-24 PROCEDURE — 99214 OFFICE O/P EST MOD 30 MIN: CPT

## 2024-04-24 PROCEDURE — L3908: CPT | Mod: RT
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
## 2024-05-10 NOTE — ED ADULT NURSE NOTE - NS ED PATIENT SAFETY CONCERN
Caller: Jd Peguero    Relationship: Self    Best call back number: [phone]     What form or medical record are you requesting: PROGRESS NOTE 5.9.24    Who is requesting this form or medical record from you: KEVIN MCCRAY (PCP)    How would you like to receive the form or medical records (pick-up, mail, fax):   If fax, what is the fax number: [phone]    Timeframe paperwork needed: NEEDS IT FIRST THING MONDAY MORNING BEFORE 11AM.    Additional notes:          No

## 2024-05-22 ENCOUNTER — NON-APPOINTMENT (OUTPATIENT)
Age: 44
End: 2024-05-22

## 2024-05-22 ENCOUNTER — APPOINTMENT (OUTPATIENT)
Dept: ORTHOPEDIC SURGERY | Facility: CLINIC | Age: 44
End: 2024-05-22
Payer: OTHER MISCELLANEOUS

## 2024-05-22 DIAGNOSIS — S63.501S UNSPECIFIED SPRAIN OF RIGHT WRIST, SEQUELA: ICD-10-CM

## 2024-05-22 PROCEDURE — 99214 OFFICE O/P EST MOD 30 MIN: CPT

## 2024-05-22 NOTE — ASSESSMENT
[FreeTextEntry1] : Patient comes in for follow-up after getting an MRI.  She still having significant pain.  She has not stopped working.  She does not have other complaints today.  Right wrist: Tender palpation over SL, nontender ovation over LT, TFCC, no pain with shucking of the DRUJ, ulnar deviation, supination, pain with Tay's, no clunking, neurovascularly intact.  MRI of the right wrist shows fusion of the DRUJ, strain of the carpus without any evidence for significant tear  Patient still having significant pain.  I believe she needs to be shut down.  She is can continue with the cock-up wrist splint.  She is going to stay out of work.  I am also recommending some gentle therapy for the wrist as well.  We need authorization for this.  She will follow-up with me in about a month or so.

## 2024-06-19 ENCOUNTER — NON-APPOINTMENT (OUTPATIENT)
Age: 44
End: 2024-06-19

## 2024-06-19 ENCOUNTER — APPOINTMENT (OUTPATIENT)
Dept: ORTHOPEDIC SURGERY | Facility: CLINIC | Age: 44
End: 2024-06-19
Payer: OTHER MISCELLANEOUS

## 2024-06-19 DIAGNOSIS — S69.91XA UNSPECIFIED INJURY OF RIGHT WRIST, HAND AND FINGER(S), INITIAL ENCOUNTER: ICD-10-CM

## 2024-06-19 PROCEDURE — 99213 OFFICE O/P EST LOW 20 MIN: CPT | Mod: 25

## 2024-06-19 PROCEDURE — 20606 DRAIN/INJ JOINT/BURSA W/US: CPT

## 2024-06-19 RX ORDER — DICLOFENAC SODIUM 75 MG/1
75 TABLET, DELAYED RELEASE ORAL
Qty: 60 | Refills: 2 | Status: ACTIVE | COMMUNITY
Start: 2024-06-19 | End: 1900-01-01

## 2024-06-19 NOTE — ASSESSMENT
[FreeTextEntry1] : Patient comes in for follow-up of her right wrist injury.  She still having significant pain.  She says it has not improved.  She states that she has been doing therapy but has pain whenever she does almost anything with the wrist.  Right wrist: Tender palpation over SL, nontender ovation over LT, TFCC, no pain with shucking of the DRUJ, ulnar deviation, supination, pain with Tay's, no clunking, neurovascularly intact.  Patient still having significant pain.  At this point the patient is shoulder self down she is worn a cock-up wrist splint and she has been going to a bit of therapy as well.  We discussed an injection to help decrease the inflammation and pain in the area.  Patient opted for injection into the wrist to help with all the pain.  I went through risk benefits alternatives of the injection with the patient.  The patient will follow-up back in about a month to see how she is doing.  Under sterile conditions, and using ultrasound guidance to visualize the 3-4 portal, the area was cleaned with Betadine and alcohol and sprayed with ethyl chloride. An injection of 2-1/2 cc total, 0.5 cc of lidocaine and 2 cc dexamethasone(4 milligrams per milliliter) was injected into the 3-4 portal. Patient tolerated the injection well. A Band-Aid was placed.

## 2024-07-22 ENCOUNTER — APPOINTMENT (OUTPATIENT)
Dept: ORTHOPEDIC SURGERY | Facility: CLINIC | Age: 44
End: 2024-07-22
Payer: OTHER MISCELLANEOUS

## 2024-07-22 ENCOUNTER — NON-APPOINTMENT (OUTPATIENT)
Age: 44
End: 2024-07-22

## 2024-07-22 DIAGNOSIS — S63.501S UNSPECIFIED SPRAIN OF RIGHT WRIST, SEQUELA: ICD-10-CM

## 2024-07-22 PROCEDURE — 73110 X-RAY EXAM OF WRIST: CPT | Mod: 50

## 2024-07-22 PROCEDURE — 99214 OFFICE O/P EST MOD 30 MIN: CPT
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
## 2024-08-21 ENCOUNTER — NON-APPOINTMENT (OUTPATIENT)
Age: 44
End: 2024-08-21

## 2024-08-21 ENCOUNTER — APPOINTMENT (OUTPATIENT)
Dept: ORTHOPEDIC SURGERY | Facility: CLINIC | Age: 44
End: 2024-08-21
Payer: OTHER MISCELLANEOUS

## 2024-08-21 DIAGNOSIS — S63.501S UNSPECIFIED SPRAIN OF RIGHT WRIST, SEQUELA: ICD-10-CM

## 2024-08-21 PROCEDURE — 99213 OFFICE O/P EST LOW 20 MIN: CPT
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
## 2024-09-23 ENCOUNTER — APPOINTMENT (OUTPATIENT)
Dept: ORTHOPEDIC SURGERY | Facility: CLINIC | Age: 44
End: 2024-09-23
Payer: OTHER MISCELLANEOUS

## 2024-09-23 ENCOUNTER — NON-APPOINTMENT (OUTPATIENT)
Age: 44
End: 2024-09-23

## 2024-09-23 DIAGNOSIS — S63.501S UNSPECIFIED SPRAIN OF RIGHT WRIST, SEQUELA: ICD-10-CM

## 2024-09-23 PROCEDURE — 99213 OFFICE O/P EST LOW 20 MIN: CPT
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
## 2024-11-11 ENCOUNTER — APPOINTMENT (OUTPATIENT)
Dept: ORTHOPEDIC SURGERY | Facility: AMBULATORY SURGERY CENTER | Age: 44
End: 2024-11-11
Payer: OTHER MISCELLANEOUS

## 2024-11-11 DIAGNOSIS — S63.641S SPRAIN OF METACARPOPHALANGEAL JOINT OF RIGHT THUMB, SEQUELA: ICD-10-CM

## 2024-11-11 PROCEDURE — 29846 WRIST ARTHROSCOPY/SURGERY: CPT | Mod: RT

## 2024-11-11 RX ORDER — OXYCODONE AND ACETAMINOPHEN 5; 325 MG/1; MG/1
5-325 TABLET ORAL
Qty: 15 | Refills: 0 | Status: ACTIVE | COMMUNITY
Start: 2024-11-11 | End: 1900-01-01

## 2024-11-13 RX ORDER — IBUPROFEN 800 MG/1
800 TABLET ORAL 3 TIMES DAILY
Qty: 90 | Refills: 0 | Status: ACTIVE | COMMUNITY
Start: 2024-11-13 | End: 1900-01-01

## 2024-11-21 ENCOUNTER — APPOINTMENT (OUTPATIENT)
Dept: ORTHOPEDIC SURGERY | Facility: CLINIC | Age: 44
End: 2024-11-21

## 2024-11-21 DIAGNOSIS — S69.91XA UNSPECIFIED INJURY OF RIGHT WRIST, HAND AND FINGER(S), INITIAL ENCOUNTER: ICD-10-CM

## 2024-11-21 PROCEDURE — 99024 POSTOP FOLLOW-UP VISIT: CPT

## 2024-12-07 NOTE — ASU PATIENT PROFILE, ADULT - NSICDXPASTMEDICALHX_GEN_ALL_CORE_FT
65yo F with PMH of vertigo, mood disorder, asthma, meningioma, HTN, prediabetes presenting with worsening blurry vision and unsteady gait over the last week. Pt reports for the last month she Has been having intermittent headaches, blood present as a sharp stabbing pain and will resolve.  Patient also reporting intermittent blurry vision, but is still able to read and use her phone.  More of a shuffling gait over the last month and has been feeling more unbalanced. Pt last saw her neurosurgeon about 6 months ago but needs to schedule a follow up visit. Meningioma last imaged 11/2023 with MRI demonstrating grossly unchanged appearing calcified meningioma off the left   posteromedial orbital roof when compared with 4/18/2023. Pt endorsing nausea and paresthesias in her fingers and toes.  Pt denies fevers, chills, vomiting,  tinnitus, rhinorrhea, cough, shortness of breath, chest pain, abdominal pain, constipation, diarrhea, hematuria, dysuria, frequency, urgency, numbness, weakness.    Patient clear that the symptoms are similar symptoms she has had over the past several years but just seem to be more frequent over the last month or 2.  In ED she feels well.  Normal neurologic examination. CN 2-12 intact, No tremors. No fasciculations. No nystagmus. Normal finger to nose and heel to shin b/l. No dysmetria.  Normal gait.  will treat symptoms, obtain labs and CT head.  If workup is unremarkable will discharge with close outpatient follow-up and return precautions. PAST MEDICAL HISTORY:  Endometriosis

## 2024-12-16 NOTE — ED PROVIDER NOTE - BIRTH SEX
Please Approve or Refuse.  Send to Pharmacy per Pt's Request: [facility]     Next Visit Date:  1/3/2025   Last Visit Date: 10/15/2024    Hemoglobin A1C (%)   Date Value   08/21/2024 6.0   04/06/2024 5.6   08/18/2023 5.9             ( goal A1C is < 7)   BP Readings from Last 3 Encounters:   12/11/24 135/77   10/30/24 (!) 148/83   10/15/24 136/70          (goal 120/80)  BUN   Date Value Ref Range Status   10/23/2024 13 8 - 23 mg/dL Final     Creatinine   Date Value Ref Range Status   10/23/2024 0.8 0.7 - 1.2 mg/dL Final     Potassium   Date Value Ref Range Status   10/23/2024 3.9 3.7 - 5.3 mmol/L Final            
Female

## 2024-12-18 ENCOUNTER — APPOINTMENT (OUTPATIENT)
Dept: ORTHOPEDIC SURGERY | Facility: CLINIC | Age: 44
End: 2024-12-18
Payer: OTHER MISCELLANEOUS

## 2024-12-18 ENCOUNTER — NON-APPOINTMENT (OUTPATIENT)
Age: 44
End: 2024-12-18

## 2024-12-18 DIAGNOSIS — S63.591A OTHER SPECIFIED SPRAIN OF RIGHT WRIST, INITIAL ENCOUNTER: ICD-10-CM

## 2024-12-18 PROCEDURE — 99024 POSTOP FOLLOW-UP VISIT: CPT

## 2024-12-20 PROBLEM — S63.591A TEAR OF TRIANGULAR FIBROCARTILAGE COMPLEX (TFCC) OF RIGHT WRIST: Status: ACTIVE | Noted: 2024-12-20

## 2025-02-05 ENCOUNTER — APPOINTMENT (OUTPATIENT)
Dept: ORTHOPEDIC SURGERY | Facility: CLINIC | Age: 45
End: 2025-02-05

## 2025-02-13 ENCOUNTER — NON-APPOINTMENT (OUTPATIENT)
Age: 45
End: 2025-02-13

## 2025-05-06 ENCOUNTER — APPOINTMENT (OUTPATIENT)
Dept: ORTHOPEDIC SURGERY | Facility: CLINIC | Age: 45
End: 2025-05-06
Payer: OTHER MISCELLANEOUS

## 2025-05-06 DIAGNOSIS — S69.91XA UNSPECIFIED INJURY OF RIGHT WRIST, HAND AND FINGER(S), INITIAL ENCOUNTER: ICD-10-CM

## 2025-05-06 PROCEDURE — 99213 OFFICE O/P EST LOW 20 MIN: CPT

## 2025-05-09 ENCOUNTER — APPOINTMENT (OUTPATIENT)
Dept: ORTHOPEDIC SURGERY | Facility: CLINIC | Age: 45
End: 2025-05-09
Payer: COMMERCIAL

## 2025-05-09 DIAGNOSIS — S79.911A UNSPECIFIED INJURY OF RIGHT HIP, INITIAL ENCOUNTER: ICD-10-CM

## 2025-05-09 PROCEDURE — 99213 OFFICE O/P EST LOW 20 MIN: CPT

## 2025-05-09 PROCEDURE — 73502 X-RAY EXAM HIP UNI 2-3 VIEWS: CPT

## 2025-06-08 ENCOUNTER — EMERGENCY (EMERGENCY)
Facility: HOSPITAL | Age: 45
LOS: 0 days | Discharge: HOME | End: 2025-06-08
Attending: EMERGENCY MEDICINE
Payer: COMMERCIAL

## 2025-06-08 VITALS
DIASTOLIC BLOOD PRESSURE: 70 MMHG | HEIGHT: 66 IN | SYSTOLIC BLOOD PRESSURE: 118 MMHG | WEIGHT: 164.91 LBS | OXYGEN SATURATION: 98 % | RESPIRATION RATE: 18 BRPM | HEART RATE: 78 BPM | TEMPERATURE: 98 F

## 2025-06-08 DIAGNOSIS — Z90.710 ACQUIRED ABSENCE OF BOTH CERVIX AND UTERUS: Chronic | ICD-10-CM

## 2025-06-08 DIAGNOSIS — R11.2 NAUSEA WITH VOMITING, UNSPECIFIED: ICD-10-CM

## 2025-06-08 DIAGNOSIS — N80.9 ENDOMETRIOSIS, UNSPECIFIED: ICD-10-CM

## 2025-06-08 DIAGNOSIS — R10.13 EPIGASTRIC PAIN: ICD-10-CM

## 2025-06-08 LAB
ALBUMIN SERPL ELPH-MCNC: 4.5 G/DL — SIGNIFICANT CHANGE UP (ref 3.5–5.2)
ALP SERPL-CCNC: 82 U/L — SIGNIFICANT CHANGE UP (ref 30–115)
ALT FLD-CCNC: 9 U/L — SIGNIFICANT CHANGE UP (ref 0–41)
ANION GAP SERPL CALC-SCNC: 10 MMOL/L — SIGNIFICANT CHANGE UP (ref 7–14)
APPEARANCE UR: CLEAR — SIGNIFICANT CHANGE UP
AST SERPL-CCNC: 13 U/L — SIGNIFICANT CHANGE UP (ref 0–41)
BASOPHILS # BLD AUTO: 0.03 K/UL — SIGNIFICANT CHANGE UP (ref 0–0.2)
BASOPHILS NFR BLD AUTO: 0.6 % — SIGNIFICANT CHANGE UP (ref 0–1)
BILIRUB SERPL-MCNC: 0.4 MG/DL — SIGNIFICANT CHANGE UP (ref 0.2–1.2)
BILIRUB UR-MCNC: NEGATIVE — SIGNIFICANT CHANGE UP
BLD GP AB SCN SERPL QL: SIGNIFICANT CHANGE UP
BUN SERPL-MCNC: 13 MG/DL — SIGNIFICANT CHANGE UP (ref 10–20)
CALCIUM SERPL-MCNC: 9.6 MG/DL — SIGNIFICANT CHANGE UP (ref 8.4–10.5)
CHLORIDE SERPL-SCNC: 102 MMOL/L — SIGNIFICANT CHANGE UP (ref 98–110)
CO2 SERPL-SCNC: 27 MMOL/L — SIGNIFICANT CHANGE UP (ref 17–32)
COLOR SPEC: YELLOW — SIGNIFICANT CHANGE UP
CREAT SERPL-MCNC: 0.7 MG/DL — SIGNIFICANT CHANGE UP (ref 0.7–1.5)
DIFF PNL FLD: NEGATIVE — SIGNIFICANT CHANGE UP
EGFR: 109 ML/MIN/1.73M2 — SIGNIFICANT CHANGE UP
EGFR: 109 ML/MIN/1.73M2 — SIGNIFICANT CHANGE UP
EOSINOPHIL # BLD AUTO: 0.34 K/UL — SIGNIFICANT CHANGE UP (ref 0–0.7)
EOSINOPHIL NFR BLD AUTO: 6.9 % — SIGNIFICANT CHANGE UP (ref 0–8)
GLUCOSE SERPL-MCNC: 82 MG/DL — SIGNIFICANT CHANGE UP (ref 70–99)
GLUCOSE UR QL: NEGATIVE MG/DL — SIGNIFICANT CHANGE UP
HCG SERPL QL: NEGATIVE — SIGNIFICANT CHANGE UP
HCT VFR BLD CALC: 41.2 % — SIGNIFICANT CHANGE UP (ref 37–47)
HGB BLD-MCNC: 14.2 G/DL — SIGNIFICANT CHANGE UP (ref 12–16)
IMM GRANULOCYTES NFR BLD AUTO: 0.4 % — HIGH (ref 0.1–0.3)
KETONES UR QL: NEGATIVE MG/DL — SIGNIFICANT CHANGE UP
LACTATE SERPL-SCNC: 0.8 MMOL/L — SIGNIFICANT CHANGE UP (ref 0.7–2)
LEUKOCYTE ESTERASE UR-ACNC: NEGATIVE — SIGNIFICANT CHANGE UP
LIDOCAIN IGE QN: 33 U/L — SIGNIFICANT CHANGE UP (ref 7–60)
LYMPHOCYTES # BLD AUTO: 1.22 K/UL — SIGNIFICANT CHANGE UP (ref 1.2–3.4)
LYMPHOCYTES # BLD AUTO: 24.9 % — SIGNIFICANT CHANGE UP (ref 20.5–51.1)
MCHC RBC-ENTMCNC: 29.6 PG — SIGNIFICANT CHANGE UP (ref 27–31)
MCHC RBC-ENTMCNC: 34.5 G/DL — SIGNIFICANT CHANGE UP (ref 32–37)
MCV RBC AUTO: 85.8 FL — SIGNIFICANT CHANGE UP (ref 81–99)
MONOCYTES # BLD AUTO: 0.44 K/UL — SIGNIFICANT CHANGE UP (ref 0.1–0.6)
MONOCYTES NFR BLD AUTO: 9 % — SIGNIFICANT CHANGE UP (ref 1.7–9.3)
NEUTROPHILS # BLD AUTO: 2.85 K/UL — SIGNIFICANT CHANGE UP (ref 1.4–6.5)
NEUTROPHILS NFR BLD AUTO: 58.2 % — SIGNIFICANT CHANGE UP (ref 42.2–75.2)
NITRITE UR-MCNC: NEGATIVE — SIGNIFICANT CHANGE UP
NRBC BLD AUTO-RTO: 0 /100 WBCS — SIGNIFICANT CHANGE UP (ref 0–0)
PH UR: 5.5 — SIGNIFICANT CHANGE UP (ref 5–8)
PLATELET # BLD AUTO: 369 K/UL — SIGNIFICANT CHANGE UP (ref 130–400)
PMV BLD: 9.6 FL — SIGNIFICANT CHANGE UP (ref 7.4–10.4)
POTASSIUM SERPL-MCNC: 4.3 MMOL/L — SIGNIFICANT CHANGE UP (ref 3.5–5)
POTASSIUM SERPL-SCNC: 4.3 MMOL/L — SIGNIFICANT CHANGE UP (ref 3.5–5)
PROT SERPL-MCNC: 7.2 G/DL — SIGNIFICANT CHANGE UP (ref 6–8)
PROT UR-MCNC: NEGATIVE MG/DL — SIGNIFICANT CHANGE UP
RBC # BLD: 4.8 M/UL — SIGNIFICANT CHANGE UP (ref 4.2–5.4)
RBC # FLD: 11.9 % — SIGNIFICANT CHANGE UP (ref 11.5–14.5)
SODIUM SERPL-SCNC: 139 MMOL/L — SIGNIFICANT CHANGE UP (ref 135–146)
SP GR SPEC: 1.02 — SIGNIFICANT CHANGE UP (ref 1–1.03)
UROBILINOGEN FLD QL: 0.2 MG/DL — SIGNIFICANT CHANGE UP (ref 0.2–1)
WBC # BLD: 4.9 K/UL — SIGNIFICANT CHANGE UP (ref 4.8–10.8)
WBC # FLD AUTO: 4.9 K/UL — SIGNIFICANT CHANGE UP (ref 4.8–10.8)

## 2025-06-08 PROCEDURE — 74177 CT ABD & PELVIS W/CONTRAST: CPT | Mod: 26

## 2025-06-08 PROCEDURE — 81003 URINALYSIS AUTO W/O SCOPE: CPT

## 2025-06-08 PROCEDURE — 83605 ASSAY OF LACTIC ACID: CPT

## 2025-06-08 PROCEDURE — 86900 BLOOD TYPING SEROLOGIC ABO: CPT

## 2025-06-08 PROCEDURE — 86850 RBC ANTIBODY SCREEN: CPT

## 2025-06-08 PROCEDURE — 85025 COMPLETE CBC W/AUTO DIFF WBC: CPT

## 2025-06-08 PROCEDURE — 99285 EMERGENCY DEPT VISIT HI MDM: CPT

## 2025-06-08 PROCEDURE — 36415 COLL VENOUS BLD VENIPUNCTURE: CPT

## 2025-06-08 PROCEDURE — 74177 CT ABD & PELVIS W/CONTRAST: CPT

## 2025-06-08 PROCEDURE — 80053 COMPREHEN METABOLIC PANEL: CPT

## 2025-06-08 PROCEDURE — 83690 ASSAY OF LIPASE: CPT

## 2025-06-08 PROCEDURE — 86901 BLOOD TYPING SEROLOGIC RH(D): CPT

## 2025-06-08 PROCEDURE — 96375 TX/PRO/DX INJ NEW DRUG ADDON: CPT

## 2025-06-08 PROCEDURE — 99284 EMERGENCY DEPT VISIT MOD MDM: CPT | Mod: 25

## 2025-06-08 PROCEDURE — 84703 CHORIONIC GONADOTROPIN ASSAY: CPT

## 2025-06-08 PROCEDURE — 96374 THER/PROPH/DIAG INJ IV PUSH: CPT | Mod: XU

## 2025-06-08 RX ORDER — ONDANSETRON HCL/PF 4 MG/2 ML
4 VIAL (ML) INJECTION ONCE
Refills: 0 | Status: COMPLETED | OUTPATIENT
Start: 2025-06-08 | End: 2025-06-08

## 2025-06-08 RX ORDER — MAGNESIUM, ALUMINUM HYDROXIDE 200-200 MG
30 TABLET,CHEWABLE ORAL ONCE
Refills: 0 | Status: COMPLETED | OUTPATIENT
Start: 2025-06-08 | End: 2025-06-08

## 2025-06-08 RX ORDER — ONDANSETRON HCL/PF 4 MG/2 ML
1 VIAL (ML) INJECTION
Qty: 10 | Refills: 0
Start: 2025-06-08 | End: 2025-06-12

## 2025-06-08 RX ADMIN — Medication 1000 MILLILITER(S): at 18:21

## 2025-06-08 RX ADMIN — Medication 4 MILLIGRAM(S): at 21:42

## 2025-06-08 RX ADMIN — Medication 2 MILLIGRAM(S): at 18:39

## 2025-06-08 RX ADMIN — Medication 4 MILLIGRAM(S): at 18:21

## 2025-06-08 RX ADMIN — Medication 30 MILLILITER(S): at 18:21

## 2025-06-08 RX ADMIN — Medication 30 MILLIGRAM(S): at 18:21

## 2025-06-08 NOTE — ED PROVIDER NOTE - PHYSICAL EXAMINATION
Gen: awake, alert, nad   Eyes: perrl, eomi   Ent: moist mm, nl voice  Neck: from, supple, (-)jvd ,c-spine tender/step-offs/lymphadenopathy/meningismus   Cv: s1,s2, rrr  Chest: ctab  Abd: Soft, nondistended.  Diffuse tenderness to palpation.  Ext: from on all ext, distal pulses present, (-) edema   Skin: nl color for race (-)rash   Neuro: anox3, cn 2-12 grossly intact motor/sensory

## 2025-06-08 NOTE — ED PROVIDER NOTE - OBJECTIVE STATEMENT
44-year-old female with a history of endometriosis presents to the ED with epigastric abdominal pain.  Worse after eating food.  Associated with nausea and vomiting.  Ongoing for the past 4 days.  Initially thought it was endometriosis flare.  7 out of 10.  Pain is diffuse.

## 2025-06-08 NOTE — ED PROVIDER NOTE - PATIENT PORTAL LINK FT
You can access the FollowMyHealth Patient Portal offered by [facility] by registering at the following website: [email or web address]. By joining [facility]’s FollowMyHealth portal, you will also be able to view your health information using other applications (apps) compatible with our system.

## 2025-06-10 ENCOUNTER — NON-APPOINTMENT (OUTPATIENT)
Age: 45
End: 2025-06-10

## 2025-06-11 ENCOUNTER — APPOINTMENT (OUTPATIENT)
Dept: OBGYN | Facility: CLINIC | Age: 45
End: 2025-06-11
Payer: COMMERCIAL

## 2025-06-11 VITALS
BODY MASS INDEX: 25.9 KG/M2 | SYSTOLIC BLOOD PRESSURE: 112 MMHG | HEIGHT: 67 IN | DIASTOLIC BLOOD PRESSURE: 76 MMHG | WEIGHT: 165 LBS | HEART RATE: 81 BPM

## 2025-06-11 LAB
BILIRUB UR QL STRIP: NEGATIVE
CLARITY UR: CLEAR
COLLECTION METHOD: NORMAL
GLUCOSE UR-MCNC: NEGATIVE
HCG UR QL: 0.2 EU/DL
HGB UR QL STRIP.AUTO: NEGATIVE
KETONES UR-MCNC: NEGATIVE
LEUKOCYTE ESTERASE UR QL STRIP: NEGATIVE
NITRITE UR QL STRIP: NEGATIVE
PH UR STRIP: 7
PROT UR STRIP-MCNC: NEGATIVE
SP GR UR STRIP: 1.02

## 2025-06-11 PROCEDURE — 99396 PREV VISIT EST AGE 40-64: CPT | Mod: 25

## 2025-06-11 PROCEDURE — 81003 URINALYSIS AUTO W/O SCOPE: CPT | Mod: QW

## 2025-06-11 PROCEDURE — 76830 TRANSVAGINAL US NON-OB: CPT

## 2025-06-11 PROCEDURE — 99459 PELVIC EXAMINATION: CPT

## 2025-06-15 LAB — HPV HIGH+LOW RISK DNA PNL CVX: NOT DETECTED

## 2025-06-24 ENCOUNTER — APPOINTMENT (OUTPATIENT)
Dept: ORTHOPEDIC SURGERY | Facility: CLINIC | Age: 45
End: 2025-06-24

## 2025-06-27 ENCOUNTER — OUTPATIENT (OUTPATIENT)
Dept: OUTPATIENT SERVICES | Facility: HOSPITAL | Age: 45
LOS: 1 days | End: 2025-06-27
Payer: COMMERCIAL

## 2025-06-27 VITALS
DIASTOLIC BLOOD PRESSURE: 73 MMHG | OXYGEN SATURATION: 100 % | TEMPERATURE: 97 F | WEIGHT: 164.91 LBS | SYSTOLIC BLOOD PRESSURE: 105 MMHG | RESPIRATION RATE: 18 BRPM | HEIGHT: 66 IN | HEART RATE: 84 BPM

## 2025-06-27 DIAGNOSIS — N80.9 ENDOMETRIOSIS, UNSPECIFIED: ICD-10-CM

## 2025-06-27 DIAGNOSIS — N94.10 UNSPECIFIED DYSPAREUNIA: ICD-10-CM

## 2025-06-27 DIAGNOSIS — Z98.890 OTHER SPECIFIED POSTPROCEDURAL STATES: Chronic | ICD-10-CM

## 2025-06-27 DIAGNOSIS — Z98.51 TUBAL LIGATION STATUS: Chronic | ICD-10-CM

## 2025-06-27 DIAGNOSIS — Z90.710 ACQUIRED ABSENCE OF BOTH CERVIX AND UTERUS: Chronic | ICD-10-CM

## 2025-06-27 LAB
ALBUMIN SERPL ELPH-MCNC: 4.2 G/DL — SIGNIFICANT CHANGE UP (ref 3.5–5.2)
ALP SERPL-CCNC: 71 U/L — SIGNIFICANT CHANGE UP (ref 30–115)
ALT FLD-CCNC: 7 U/L — SIGNIFICANT CHANGE UP (ref 0–41)
ANION GAP SERPL CALC-SCNC: 10 MMOL/L — SIGNIFICANT CHANGE UP (ref 7–14)
AST SERPL-CCNC: 13 U/L — SIGNIFICANT CHANGE UP (ref 0–41)
BASOPHILS # BLD AUTO: 0.05 K/UL — SIGNIFICANT CHANGE UP (ref 0–0.2)
BASOPHILS NFR BLD AUTO: 1.1 % — HIGH (ref 0–1)
BILIRUB SERPL-MCNC: 1 MG/DL — SIGNIFICANT CHANGE UP (ref 0.2–1.2)
BUN SERPL-MCNC: 11 MG/DL — SIGNIFICANT CHANGE UP (ref 10–20)
CALCIUM SERPL-MCNC: 9.4 MG/DL — SIGNIFICANT CHANGE UP (ref 8.4–10.5)
CHLORIDE SERPL-SCNC: 105 MMOL/L — SIGNIFICANT CHANGE UP (ref 98–110)
CO2 SERPL-SCNC: 22 MMOL/L — SIGNIFICANT CHANGE UP (ref 17–32)
CREAT SERPL-MCNC: 0.8 MG/DL — SIGNIFICANT CHANGE UP (ref 0.7–1.5)
EGFR: 93 ML/MIN/1.73M2 — SIGNIFICANT CHANGE UP
EGFR: 93 ML/MIN/1.73M2 — SIGNIFICANT CHANGE UP
EOSINOPHIL # BLD AUTO: 0.35 K/UL — SIGNIFICANT CHANGE UP (ref 0–0.7)
EOSINOPHIL NFR BLD AUTO: 7.7 % — SIGNIFICANT CHANGE UP (ref 0–8)
GLUCOSE SERPL-MCNC: 93 MG/DL — SIGNIFICANT CHANGE UP (ref 70–99)
HCT VFR BLD CALC: 42.8 % — SIGNIFICANT CHANGE UP (ref 37–47)
HGB BLD-MCNC: 14.2 G/DL — SIGNIFICANT CHANGE UP (ref 12–16)
IMM GRANULOCYTES NFR BLD AUTO: 0.4 % — HIGH (ref 0.1–0.3)
LYMPHOCYTES # BLD AUTO: 1.16 K/UL — LOW (ref 1.2–3.4)
LYMPHOCYTES # BLD AUTO: 25.5 % — SIGNIFICANT CHANGE UP (ref 20.5–51.1)
MCHC RBC-ENTMCNC: 28.1 PG — SIGNIFICANT CHANGE UP (ref 27–31)
MCHC RBC-ENTMCNC: 33.2 G/DL — SIGNIFICANT CHANGE UP (ref 32–37)
MCV RBC AUTO: 84.8 FL — SIGNIFICANT CHANGE UP (ref 81–99)
MONOCYTES # BLD AUTO: 0.43 K/UL — SIGNIFICANT CHANGE UP (ref 0.1–0.6)
MONOCYTES NFR BLD AUTO: 9.5 % — HIGH (ref 1.7–9.3)
NEUTROPHILS # BLD AUTO: 2.54 K/UL — SIGNIFICANT CHANGE UP (ref 1.4–6.5)
NEUTROPHILS NFR BLD AUTO: 55.8 % — SIGNIFICANT CHANGE UP (ref 42.2–75.2)
NRBC BLD AUTO-RTO: 0 /100 WBCS — SIGNIFICANT CHANGE UP (ref 0–0)
PLATELET # BLD AUTO: 290 K/UL — SIGNIFICANT CHANGE UP (ref 130–400)
PMV BLD: 10.6 FL — HIGH (ref 7.4–10.4)
POTASSIUM SERPL-MCNC: 4.7 MMOL/L — SIGNIFICANT CHANGE UP (ref 3.5–5)
POTASSIUM SERPL-SCNC: 4.7 MMOL/L — SIGNIFICANT CHANGE UP (ref 3.5–5)
PROT SERPL-MCNC: 6.8 G/DL — SIGNIFICANT CHANGE UP (ref 6–8)
RBC # BLD: 5.05 M/UL — SIGNIFICANT CHANGE UP (ref 4.2–5.4)
RBC # FLD: 12.1 % — SIGNIFICANT CHANGE UP (ref 11.5–14.5)
SODIUM SERPL-SCNC: 137 MMOL/L — SIGNIFICANT CHANGE UP (ref 135–146)
WBC # BLD: 4.55 K/UL — LOW (ref 4.8–10.8)
WBC # FLD AUTO: 4.55 K/UL — LOW (ref 4.8–10.8)

## 2025-06-27 PROCEDURE — 80053 COMPREHEN METABOLIC PANEL: CPT

## 2025-06-27 PROCEDURE — 36415 COLL VENOUS BLD VENIPUNCTURE: CPT

## 2025-06-27 PROCEDURE — 99214 OFFICE O/P EST MOD 30 MIN: CPT | Mod: 25

## 2025-06-27 PROCEDURE — 85025 COMPLETE CBC W/AUTO DIFF WBC: CPT

## 2025-06-27 NOTE — H&P PST ADULT - NS PRO FEM REPRO HEALTH SCREEN
HTN: continue coreg and losartan  EP: continue pradaxa or other anticoagulation per EP. Continue amiodarone for 90 days. Eliquis is cheaper with new medicare insurance?  DLD: continue statin    Vitamin D weekly  B12 daily    Keep f/u with EP    Refer back to dermatology. Has been >1 year.     F/u 6 months, labs prior.   
mammogram

## 2025-06-27 NOTE — H&P PST ADULT - HISTORY OF PRESENT ILLNESS
Patient is a 44 year old male presenting to PAST in preparation for  PELVIC EXAMINATION UNDER ANESTHESIA, ROBOTIC EXCISION OF ENDOMETRIOSIS, POSSIBLE BILATERAL SALPINGOOPHORECTOMY on 7/10  under gen anesthesia by Dr. Michelle  PATIENT CURRENTLY DENIES CHEST PAIN  SHORTNESS OF BREATH  PALPITATIONS,  DYSURIA, OR UPPER RESPIRATORY INFECTION IN PAST 2 WEEKS    Anesthesia Alert  NO--Difficult Airway  NO--History of neck surgery or radiation  NO--Limited ROM of neck  NO--History of Malignant hyperthermia  NO--Personal or family history of Pseudocholinesterase deficiency  NO--Prior Anesthesia Complication  NO--Latex Allergy  NO--Loose teeth  NO--History of Rheumatoid Arthritis  NO--JOSIAH  NO-- BLEEDING RISK  NO--Other_____    As per patient, this is their complete medical and surgical history, including medications both prescribed or over the counter.  Patient verbalized understanding of instructions and was given the opportunity to ask questions and have them answered.   Patient is a 44 year old male presenting to PAST in preparation for  PELVIC EXAMINATION UNDER ANESTHESIA, ROBOTIC EXCISION OF ENDOMETRIOSIS, POSSIBLE BILATERAL SALPINGOOPHORECTOMY on 7/10  under gen anesthesia by Dr. Michelle  Reports h/o endometriosis has been advised to have above  PATIENT CURRENTLY DENIES CHEST PAIN  SHORTNESS OF BREATH  PALPITATIONS,  DYSURIA, OR UPPER RESPIRATORY INFECTION IN PAST 2 WEEKS    Anesthesia Alert  NO--Difficult Airway  NO--History of neck surgery or radiation  NO--Limited ROM of neck  NO--History of Malignant hyperthermia  NO--Personal or family history of Pseudocholinesterase deficiency  NO--Prior Anesthesia Complication  NO--Latex Allergy  NO--Loose teeth  NO--History of Rheumatoid Arthritis  NO--JOSIAH  NO-- BLEEDING RISK  NO--Other_____      [employer] Activity Status Index (DASI) from [employer]  on 6/27/2025      RESULT SUMMARY:  58.2 points  The higher the score (maximum 58.2), the higher the functional status.    9.89 METs        INPUTS:  Take care of self —> 2.75 = Yes  Walk indoors —> 1.75 = Yes  Walk 1&ndash;2 blocks on level ground —> 2.75 = Yes  Climb a flight of stairs or walk up a hill —> 5.5 = Yes  Run a short distance —> 8 = Yes  Do light work around the house —> 2.7 = Yes  Do moderate work around the house —> 3.5 = Yes  Do heavy work around the house —> 8 = Yes  Do yardwork —> 4.5 = Yes  Have sexual relations —> 5.25 = Yes  Participate in moderate recreational activities —> 6 = Yes  Participate in strenuous sports —> 7.5 = Yes        Revised Cardiac Risk Index for Pre-Operative Risk from [employer]  on 6/27/2025      RESULT SUMMARY:  0 points  RCRI Score    3.9 %  Risk of major cardiac event      INPUTS:  High-risk surgery —> 0 = No  History of ischemic heart disease —> 0 = No  History of congestive heart failure —> 0 = No  History of cerebrovascular disease —> 0 = No  Pre-operative treatment with insulin —> 0 = No  Pre-operative creatinine >2 mg/dL / 176.8 µmol/L —> 0 = No        As per patient, this is their complete medical and surgical history, including medications both prescribed or over the counter.  Patient verbalized understanding of instructions and was given the opportunity to ask questions and have them answered.

## 2025-06-27 NOTE — H&P PST ADULT - NSICDXFAMILYHX_GEN_ALL_CORE_FT
FAMILY HISTORY:  Father  Still living? Unknown  Known health problems: none, Age at diagnosis: Age Unknown    Mother  Still living? Unknown  Known health problems: none, Age at diagnosis: Age Unknown     FAMILY HISTORY:  Father  Still living? Yes, Estimated age: Age Unknown  Family history of CABG, Age at diagnosis: Age Unknown  FH: diabetes mellitus, Age at diagnosis: Age Unknown  FH: hypercholesterolemia, Age at diagnosis: Age Unknown  FH: melanoma, Age at diagnosis: Age Unknown    Mother  Still living? Yes, Estimated age: Age Unknown  FH: hypothyroidism, Age at diagnosis: Age Unknown

## 2025-06-27 NOTE — H&P PST ADULT - NSICDXPASTSURGICALHX_GEN_ALL_CORE_FT
PAST SURGICAL HISTORY:  H/O: hysterectomy 2019  ENDOMETRIAL ABLATION   tubal ligation  appendectomy at age 18     PAST SURGICAL HISTORY:  H/O: hysterectomy 2019  ENDOMETRIAL ABLATION   tubal ligation  appendectomy at age 18    History of appendectomy     S/P endometrial ablation     S/P tubal ligation

## 2025-06-28 DIAGNOSIS — N94.10 UNSPECIFIED DYSPAREUNIA: ICD-10-CM

## 2025-06-28 DIAGNOSIS — N80.9 ENDOMETRIOSIS, UNSPECIFIED: ICD-10-CM

## 2025-07-09 NOTE — ASU PATIENT PROFILE, ADULT - NSICDXPASTSURGICALHX_GEN_ALL_CORE_FT
PAST SURGICAL HISTORY:  H/O: hysterectomy 2019  ENDOMETRIAL ABLATION   tubal ligation  appendectomy at age 18    History of appendectomy     S/P endometrial ablation     S/P tubal ligation

## 2025-07-09 NOTE — ASU PATIENT PROFILE, ADULT - FALL HARM RISK - UNIVERSAL INTERVENTIONS
Bed in lowest position, wheels locked, appropriate side rails in place/Call bell, personal items and telephone in reach/Instruct patient to call for assistance before getting out of bed or chair/Non-slip footwear when patient is out of bed/[place] to call system/Physically safe environment - no spills, clutter or unnecessary equipment/Purposeful Proactive Rounding/Room/bathroom lighting operational, light cord in reach

## 2025-07-10 ENCOUNTER — TRANSCRIPTION ENCOUNTER (OUTPATIENT)
Age: 45
End: 2025-07-10

## 2025-07-10 ENCOUNTER — RESULT REVIEW (OUTPATIENT)
Age: 45
End: 2025-07-10

## 2025-07-10 ENCOUNTER — OUTPATIENT (OUTPATIENT)
Dept: OUTPATIENT SERVICES | Facility: HOSPITAL | Age: 45
LOS: 1 days | Discharge: HOME | End: 2025-07-10
Payer: COMMERCIAL

## 2025-07-10 ENCOUNTER — APPOINTMENT (OUTPATIENT)
Dept: OBGYN | Facility: HOSPITAL | Age: 45
End: 2025-07-10

## 2025-07-10 VITALS
RESPIRATION RATE: 16 BRPM | WEIGHT: 164.91 LBS | OXYGEN SATURATION: 96 % | TEMPERATURE: 98 F | HEART RATE: 77 BPM | DIASTOLIC BLOOD PRESSURE: 57 MMHG | HEIGHT: 66 IN | SYSTOLIC BLOOD PRESSURE: 98 MMHG

## 2025-07-10 VITALS
SYSTOLIC BLOOD PRESSURE: 100 MMHG | RESPIRATION RATE: 18 BRPM | DIASTOLIC BLOOD PRESSURE: 55 MMHG | OXYGEN SATURATION: 97 % | HEART RATE: 72 BPM

## 2025-07-10 DIAGNOSIS — N94.10 UNSPECIFIED DYSPAREUNIA: ICD-10-CM

## 2025-07-10 DIAGNOSIS — Z90.710 ACQUIRED ABSENCE OF BOTH CERVIX AND UTERUS: Chronic | ICD-10-CM

## 2025-07-10 DIAGNOSIS — Z98.51 TUBAL LIGATION STATUS: Chronic | ICD-10-CM

## 2025-07-10 DIAGNOSIS — N80.9 ENDOMETRIOSIS, UNSPECIFIED: ICD-10-CM

## 2025-07-10 DIAGNOSIS — Z98.890 OTHER SPECIFIED POSTPROCEDURAL STATES: Chronic | ICD-10-CM

## 2025-07-10 LAB — BLD GP AB SCN SERPL QL: SIGNIFICANT CHANGE UP

## 2025-07-10 PROCEDURE — S2900: CPT

## 2025-07-10 PROCEDURE — 58662 LAPAROSCOPY EXCISE LESIONS: CPT | Mod: 59

## 2025-07-10 PROCEDURE — 86850 RBC ANTIBODY SCREEN: CPT

## 2025-07-10 PROCEDURE — C9399: CPT

## 2025-07-10 PROCEDURE — 36415 COLL VENOUS BLD VENIPUNCTURE: CPT

## 2025-07-10 PROCEDURE — 50715 RELEASE OF URETER: CPT | Mod: 50,59

## 2025-07-10 PROCEDURE — 86900 BLOOD TYPING SEROLOGIC ABO: CPT

## 2025-07-10 PROCEDURE — 88304 TISSUE EXAM BY PATHOLOGIST: CPT | Mod: 26

## 2025-07-10 PROCEDURE — 88304 TISSUE EXAM BY PATHOLOGIST: CPT

## 2025-07-10 PROCEDURE — 86901 BLOOD TYPING SEROLOGIC RH(D): CPT

## 2025-07-10 PROCEDURE — C1889: CPT

## 2025-07-10 RX ORDER — ONDANSETRON HCL/PF 4 MG/2 ML
4 VIAL (ML) INJECTION ONCE
Refills: 0 | Status: DISCONTINUED | OUTPATIENT
Start: 2025-07-10 | End: 2025-07-10

## 2025-07-10 RX ORDER — APREPITANT 40 MG/1
40 CAPSULE ORAL ONCE
Refills: 0 | Status: COMPLETED | OUTPATIENT
Start: 2025-07-10 | End: 2025-07-10

## 2025-07-10 RX ORDER — KETOROLAC TROMETHAMINE 30 MG/ML
30 INJECTION, SOLUTION INTRAMUSCULAR; INTRAVENOUS ONCE
Refills: 0 | Status: DISCONTINUED | OUTPATIENT
Start: 2025-07-10 | End: 2025-07-10

## 2025-07-10 RX ORDER — HYDROMORPHONE/SOD CHLOR,ISO/PF 2 MG/10 ML
0.5 SYRINGE (ML) INJECTION
Refills: 0 | Status: DISCONTINUED | OUTPATIENT
Start: 2025-07-10 | End: 2025-07-10

## 2025-07-10 RX ORDER — PROGESTERONE 200 MG/1
CAPSULE ORAL
Refills: 0 | DISCHARGE

## 2025-07-10 RX ORDER — ACETAMINOPHEN 500 MG/5ML
3 LIQUID (ML) ORAL
Qty: 168 | Refills: 0
Start: 2025-07-10 | End: 2025-07-23

## 2025-07-10 RX ORDER — HYDROMORPHONE/SOD CHLOR,ISO/PF 2 MG/10 ML
1 SYRINGE (ML) INJECTION
Refills: 0 | Status: DISCONTINUED | OUTPATIENT
Start: 2025-07-10 | End: 2025-07-10

## 2025-07-10 RX ORDER — IBUPROFEN 200 MG
1 TABLET ORAL
Qty: 42 | Refills: 0
Start: 2025-07-10 | End: 2025-07-23

## 2025-07-10 RX ORDER — SIMETHICONE 80 MG
80 TABLET,CHEWABLE ORAL EVERY 4 HOURS
Refills: 0 | Status: DISCONTINUED | OUTPATIENT
Start: 2025-07-10 | End: 2025-07-10

## 2025-07-10 RX ORDER — ACETAMINOPHEN 500 MG/5ML
1000 LIQUID (ML) ORAL ONCE
Refills: 0 | Status: COMPLETED | OUTPATIENT
Start: 2025-07-10 | End: 2025-07-10

## 2025-07-10 RX ADMIN — KETOROLAC TROMETHAMINE 30 MILLIGRAM(S): 30 INJECTION, SOLUTION INTRAMUSCULAR; INTRAVENOUS at 16:05

## 2025-07-10 RX ADMIN — Medication 1000 MILLIGRAM(S): at 07:02

## 2025-07-10 RX ADMIN — APREPITANT 40 MILLIGRAM(S): 40 CAPSULE ORAL at 07:02

## 2025-07-10 RX ADMIN — Medication 0.5 MILLIGRAM(S): at 14:41

## 2025-07-10 RX ADMIN — Medication 80 MILLIGRAM(S): at 14:55

## 2025-07-10 NOTE — ASU PREOP CHECKLIST - 2.
Jaylen waiver signed for right ear cartilage piercing AyeshaBanner waiver signed for right ear cartilage piercing, OR PAWEL Mcneil aware

## 2025-07-10 NOTE — ASU DISCHARGE PLAN (ADULT/PEDIATRIC) - ***IN THE EVENT THAT YOU DEVELOP A COMPLICATION AND YOU ARE UNABLE TO REACH YOUR OWN PHYSICIAN, YOU MAY CONTACT:
Problem: Pain  Goal: #Acceptable pain level achieved/maintained at rest using NRS/Faces  Description: This goal is used for patients who can self-report.  Acceptable means the level is at or below the identified comfort/function goal.  Outcome: Outcome Met, Continue evaluating goal progress toward completion     Problem: At Risk for Falls  Goal: # Patient does not fall  Outcome: Outcome Met, Continue evaluating goal progress toward completion      Statement Selected

## 2025-07-10 NOTE — ASU DISCHARGE PLAN (ADULT/PEDIATRIC) - ASU DC SPECIAL INSTRUCTIONSFT
DIET  - You may resume your normal diet. Eat a well-balanced diet. You may prefer to eat light meals for the first few days after surgery.  Drink plenty of water (6-8 glasses a day).      ACTIVITY:   - No heavy lifting/pushing/pulling for 2 weeks. Do not lift anything more than 10 lbs (such as laundry, groceries, children, pets), vacuum, push heavy doors or grocery carts, etc, for 2 weeks.  - You may climb stairs as tolerated.  -  Do not put anything in the vagina for at least 2 weeks after surgery unless otherwise instructed by your doctor (including tampons, douching, sexual intercourse, etc).  -  No driving for 1 week after surgery and not while taking narcotic pain medication. Drive defensively when you are ready.  -  Avoid sitting or lying in bed for more than 2 hours at a time while you are awake to reduce your risk of blood clots.    WOUND CARE: You have 5 incisions that are covered with surgical glue (Dermabond).  After 24 hours you may get your incisions wet.  Do not submerge in water (no tub baths or pools), showering is OK.  The Dermabond will loosen from the skin and fall off in 5 to 10 days.  Do not apply any ointments, lotions, creams or tape over the Dermabond.    PAIN MANAGEMENT:   Alternate Tylenol and ibuprofen/Motrin  - Tylenol – 975mg every 6 hours as needed. The maximum dose of Tylenol is 4000 mg in 24 hours.  - Motrin/Ibuprofen - 800 mg every 8 hours as needed (try to take with food). The maximum dose of Motrin/ibuprofen is 2400mg in 24 hours  -  A warm shower, heating pad, and/or walking may help.    WHAT TO EXPECT AT HOME  - Recovery from surgery is generally 2-3  weeks, but sometimes longer for more strenuous activity. It is normal to be very tired during this time.    - You may experience gas pain, abdominal swelling, or shoulder pain for 24-72 hours after surgery. This is from the carbon dioxide gas put into your abdomen to better visualize your organs. A warm shower, heating pad, and/or walking may help.    WHEN TO CALL YOUR DOCTOR:  - Fever (>100.4°F or 38.0°C) or chills  - Incision problems such as redness, warmth, swelling, or foul smelling drainage.  - Severe nausea or persistent vomiting.  - Bright red vaginal bleeding (soaking >1 pad/hour) or foul smelling vaginal drainage.  - Severe pain not relieved with pain medication.  - Pain with urination, cloudy urine, or foul smelling urine.  - Or if you have any other problems or questions.

## 2025-07-10 NOTE — BRIEF OPERATIVE NOTE - OPERATION/FINDINGS
Endometriosis noted anterior abdominal wall, IP ligament, pelvic sidewall, vaginal cuff. Extensive sigmoid colon adhesion to sidewall.   Normal appearing bilateral ovaries. Surgically absent bilateral tubes, uterus, cervix.

## 2025-07-10 NOTE — BRIEF OPERATIVE NOTE - NSICDXBRIEFPROCEDURE_GEN_ALL_CORE_FT
PROCEDURES:  Excision, endometriosis, laparoscopic, robot-assisted 10-Jul-2025 12:12:10  Hugh Cheema  Bilateral ureterolysis 10-Jul-2025 12:12:17  Hugh Cheema

## 2025-07-10 NOTE — ASU DISCHARGE PLAN (ADULT/PEDIATRIC) - FINANCIAL ASSISTANCE
[facility] provides services at a reduced cost to those who are determined to be eligible through [facility]’s financial assistance program. Information regarding [facility]’s financial assistance program can be found by going to [email or web address] or by calling [phone].

## 2025-07-10 NOTE — ASU DISCHARGE PLAN (ADULT/PEDIATRIC) - CARE PROVIDER_API CALL
Brendon Michelle  Gynecology  [facility] [place] [address]  Phone: [phone]  Fax: [phone]  Follow Up Time:

## 2025-07-11 ENCOUNTER — NON-APPOINTMENT (OUTPATIENT)
Age: 45
End: 2025-07-11

## 2025-07-11 ENCOUNTER — APPOINTMENT (OUTPATIENT)
Dept: OBGYN | Facility: CLINIC | Age: 45
End: 2025-07-11
Payer: COMMERCIAL

## 2025-07-11 VITALS
DIASTOLIC BLOOD PRESSURE: 71 MMHG | HEART RATE: 74 BPM | HEIGHT: 67 IN | WEIGHT: 165 LBS | BODY MASS INDEX: 25.9 KG/M2 | SYSTOLIC BLOOD PRESSURE: 106 MMHG

## 2025-07-11 PROBLEM — R39.89 SUSPECTED UTI: Status: ACTIVE | Noted: 2025-07-11 | Resolved: 2025-08-10

## 2025-07-11 LAB — SURGICAL PATHOLOGY STUDY: SIGNIFICANT CHANGE UP

## 2025-07-11 PROCEDURE — 99024 POSTOP FOLLOW-UP VISIT: CPT

## 2025-07-11 RX ORDER — CEPHALEXIN 500 MG/1
500 CAPSULE ORAL TWICE DAILY
Qty: 14 | Refills: 1 | Status: ACTIVE | COMMUNITY
Start: 2025-07-11 | End: 1900-01-01

## 2025-07-11 RX ORDER — PHENAZOPYRIDINE HYDROCHLORIDE 200 MG/1
200 TABLET ORAL 3 TIMES DAILY
Qty: 9 | Refills: 3 | Status: ACTIVE | COMMUNITY
Start: 2025-07-11 | End: 1900-01-01

## 2025-07-14 ENCOUNTER — NON-APPOINTMENT (OUTPATIENT)
Age: 45
End: 2025-07-14

## 2025-07-14 DIAGNOSIS — N80.C19: ICD-10-CM

## 2025-07-14 DIAGNOSIS — N80.42 ENDOMETRIOSIS OF RECTOVAGINAL SEPTUM WITH INVOLVEMENT OF VAGINA: ICD-10-CM

## 2025-07-14 DIAGNOSIS — N80.30 ENDOMETRIOSIS OF PELVIC PERITONEUM, UNSPECIFIED: ICD-10-CM

## 2025-07-14 DIAGNOSIS — N80.9 ENDOMETRIOSIS, UNSPECIFIED: ICD-10-CM

## 2025-07-22 ENCOUNTER — APPOINTMENT (OUTPATIENT)
Dept: OBGYN | Facility: CLINIC | Age: 45
End: 2025-07-22
Payer: COMMERCIAL

## 2025-07-22 VITALS
HEIGHT: 67 IN | SYSTOLIC BLOOD PRESSURE: 91 MMHG | BODY MASS INDEX: 25.9 KG/M2 | WEIGHT: 165 LBS | DIASTOLIC BLOOD PRESSURE: 59 MMHG | HEART RATE: 85 BPM

## 2025-07-22 DIAGNOSIS — Z98.890 OTHER SPECIFIED POSTPROCEDURAL STATES: ICD-10-CM

## 2025-07-22 DIAGNOSIS — N39.46 MIXED INCONTINENCE: ICD-10-CM

## 2025-07-22 LAB
BILIRUB UR QL STRIP: NORMAL
CLARITY UR: CLEAR
COLLECTION METHOD: NORMAL
GLUCOSE UR-MCNC: NORMAL
HCG UR QL: 0.2 EU/DL
HGB UR QL STRIP.AUTO: NORMAL
KETONES UR-MCNC: NORMAL
LEUKOCYTE ESTERASE UR QL STRIP: NORMAL
NITRITE UR QL STRIP: NORMAL
PH UR STRIP: 5.5
PROT UR STRIP-MCNC: NORMAL
SP GR UR STRIP: 1.02

## 2025-07-22 PROCEDURE — 81003 URINALYSIS AUTO W/O SCOPE: CPT | Mod: QW

## 2025-07-22 PROCEDURE — 99024 POSTOP FOLLOW-UP VISIT: CPT

## 2025-07-29 ENCOUNTER — APPOINTMENT (OUTPATIENT)
Dept: OBGYN | Facility: CLINIC | Age: 45
End: 2025-07-29
Payer: COMMERCIAL

## 2025-07-29 ENCOUNTER — NON-APPOINTMENT (OUTPATIENT)
Age: 45
End: 2025-07-29

## 2025-07-29 VITALS
BODY MASS INDEX: 25.9 KG/M2 | SYSTOLIC BLOOD PRESSURE: 105 MMHG | DIASTOLIC BLOOD PRESSURE: 63 MMHG | HEIGHT: 67 IN | HEART RATE: 70 BPM | WEIGHT: 165 LBS

## 2025-07-29 DIAGNOSIS — N32.9 BLADDER DISORDER, UNSPECIFIED: ICD-10-CM

## 2025-07-29 LAB
BILIRUB UR QL STRIP: NEGATIVE
CLARITY UR: CLEAR
COLLECTION METHOD: NORMAL
GLUCOSE UR-MCNC: NEGATIVE
HCG UR QL: 0.2 EU/DL
HGB UR QL STRIP.AUTO: NEGATIVE
KETONES UR-MCNC: NEGATIVE
LEUKOCYTE ESTERASE UR QL STRIP: NEGATIVE
NITRITE UR QL STRIP: NEGATIVE
PH UR STRIP: 5.5
PROT UR STRIP-MCNC: NEGATIVE
SP GR UR STRIP: 1.02

## 2025-07-29 PROCEDURE — 99024 POSTOP FOLLOW-UP VISIT: CPT

## 2025-07-29 PROCEDURE — 81003 URINALYSIS AUTO W/O SCOPE: CPT | Mod: QW

## 2025-07-29 RX ORDER — SOLIFENACIN SUCCINATE 5 MG/1
5 TABLET ORAL
Qty: 30 | Refills: 0 | Status: COMPLETED | COMMUNITY
Start: 2025-07-22 | End: 2025-07-29

## 2025-07-31 LAB — BACTERIA UR CULT: NORMAL

## 2025-08-06 ENCOUNTER — APPOINTMENT (OUTPATIENT)
Dept: ORTHOPEDIC SURGERY | Facility: CLINIC | Age: 45
End: 2025-08-06
Payer: OTHER MISCELLANEOUS

## 2025-08-06 DIAGNOSIS — S63.591A OTHER SPECIFIED SPRAIN OF RIGHT WRIST, INITIAL ENCOUNTER: ICD-10-CM

## 2025-08-06 PROCEDURE — 99243 OFF/OP CNSLTJ NEW/EST LOW 30: CPT

## 2025-08-19 ENCOUNTER — NON-APPOINTMENT (OUTPATIENT)
Age: 45
End: 2025-08-19

## 2025-08-19 ENCOUNTER — APPOINTMENT (OUTPATIENT)
Dept: OBGYN | Facility: CLINIC | Age: 45
End: 2025-08-19
Payer: COMMERCIAL

## 2025-08-19 VITALS — WEIGHT: 165 LBS | HEIGHT: 67 IN | BODY MASS INDEX: 25.9 KG/M2

## 2025-08-19 DIAGNOSIS — N32.9 BLADDER DISORDER, UNSPECIFIED: ICD-10-CM

## 2025-08-19 DIAGNOSIS — R92.30 DENSE BREASTS, UNSPECIFIED: ICD-10-CM

## 2025-08-19 DIAGNOSIS — N94.10 UNSPECIFIED DYSPAREUNIA: ICD-10-CM

## 2025-08-19 PROCEDURE — 99213 OFFICE O/P EST LOW 20 MIN: CPT | Mod: 24

## 2025-08-19 PROCEDURE — 99459 PELVIC EXAMINATION: CPT

## 2025-08-19 RX ORDER — MIRABEGRON 25 MG/1
25 TABLET, EXTENDED RELEASE ORAL
Refills: 0 | Status: ACTIVE | COMMUNITY